# Patient Record
Sex: MALE | Race: WHITE | NOT HISPANIC OR LATINO | Employment: OTHER | URBAN - METROPOLITAN AREA
[De-identification: names, ages, dates, MRNs, and addresses within clinical notes are randomized per-mention and may not be internally consistent; named-entity substitution may affect disease eponyms.]

---

## 2020-04-13 ENCOUNTER — TELEPHONE (OUTPATIENT)
Dept: TRANSPLANT | Facility: CLINIC | Age: 70
End: 2020-04-13

## 2020-04-13 DIAGNOSIS — I50.9 CONGESTIVE HEART FAILURE, UNSPECIFIED HF CHRONICITY, UNSPECIFIED HEART FAILURE TYPE: Primary | ICD-10-CM

## 2020-04-13 NOTE — TELEPHONE ENCOUNTER
"Per referral coordinator, pt was offered a consult appt ASAP.  Pt declined stating he did not intend to risk covid exposure and chose to wait until early June to make his appt.    Pt reports he walks "2 miles every day and I'm fine waiting for now".   Pt instructed to call if he wishes to move to a sooner appt.   "

## 2020-06-05 ENCOUNTER — INITIAL CONSULT (OUTPATIENT)
Dept: TRANSPLANT | Facility: CLINIC | Age: 70
End: 2020-06-05
Attending: INTERNAL MEDICINE
Payer: MEDICARE

## 2020-06-05 ENCOUNTER — CLINICAL SUPPORT (OUTPATIENT)
Dept: TRANSPLANT | Facility: CLINIC | Age: 70
End: 2020-06-05
Payer: MEDICARE

## 2020-06-05 ENCOUNTER — EDUCATION (OUTPATIENT)
Dept: TRANSPLANT | Facility: CLINIC | Age: 70
End: 2020-06-05

## 2020-06-05 ENCOUNTER — HOSPITAL ENCOUNTER (OUTPATIENT)
Dept: PULMONOLOGY | Facility: CLINIC | Age: 70
Discharge: HOME | End: 2020-06-05
Attending: INTERNAL MEDICINE
Payer: MEDICARE

## 2020-06-05 ENCOUNTER — HOSPITAL ENCOUNTER (OUTPATIENT)
Dept: CARDIOLOGY | Facility: CLINIC | Age: 70
Discharge: HOME | End: 2020-06-05
Attending: INTERNAL MEDICINE
Payer: MEDICARE

## 2020-06-05 VITALS — BODY MASS INDEX: 25.11 KG/M2 | WEIGHT: 160 LBS | HEIGHT: 67 IN

## 2020-06-05 VITALS
WEIGHT: 164.88 LBS | SYSTOLIC BLOOD PRESSURE: 113 MMHG | DIASTOLIC BLOOD PRESSURE: 77 MMHG | BODY MASS INDEX: 25.88 KG/M2 | HEIGHT: 67 IN | HEART RATE: 54 BPM

## 2020-06-05 DIAGNOSIS — I50.22 CHRONIC SYSTOLIC CONGESTIVE HEART FAILURE: Primary | ICD-10-CM

## 2020-06-05 DIAGNOSIS — I48.91 ATRIAL FIBRILLATION WITH CONTROLLED VENTRICULAR RESPONSE: ICD-10-CM

## 2020-06-05 DIAGNOSIS — I25.10 CORONARY ARTERY DISEASE INVOLVING NATIVE CORONARY ARTERY OF NATIVE HEART WITHOUT ANGINA PECTORIS: ICD-10-CM

## 2020-06-05 DIAGNOSIS — Z87.11 HISTORY OF PEPTIC ULCER: ICD-10-CM

## 2020-06-05 DIAGNOSIS — I50.9 CONGESTIVE HEART FAILURE, UNSPECIFIED HF CHRONICITY, UNSPECIFIED HEART FAILURE TYPE: ICD-10-CM

## 2020-06-05 DIAGNOSIS — I42.9 CARDIOMYOPATHY, UNSPECIFIED TYPE: ICD-10-CM

## 2020-06-05 DIAGNOSIS — N18.3 ACUTE RENAL FAILURE SUPERIMPOSED ON STAGE 3 CHRONIC KIDNEY DISEASE, UNSPECIFIED ACUTE RENAL FAILURE TYPE: ICD-10-CM

## 2020-06-05 DIAGNOSIS — R74.01 ELEVATED TRANSAMINASE LEVEL: ICD-10-CM

## 2020-06-05 DIAGNOSIS — I50.22 CHRONIC SYSTOLIC CONGESTIVE HEART FAILURE: ICD-10-CM

## 2020-06-05 DIAGNOSIS — Z85.72 HISTORY OF B-CELL LYMPHOMA: ICD-10-CM

## 2020-06-05 DIAGNOSIS — N17.9 ACUTE RENAL FAILURE SUPERIMPOSED ON STAGE 3 CHRONIC KIDNEY DISEASE, UNSPECIFIED ACUTE RENAL FAILURE TYPE: ICD-10-CM

## 2020-06-05 PROCEDURE — 99213 OFFICE O/P EST LOW 20 MIN: CPT | Mod: PBBFAC,25,TXP | Performed by: INTERNAL MEDICINE

## 2020-06-05 PROCEDURE — 99999 PR PBB SHADOW E&M-EST. PATIENT-LVL III: CPT | Mod: PBBFAC,TXP,, | Performed by: INTERNAL MEDICINE

## 2020-06-05 PROCEDURE — 93005 ELECTROCARDIOGRAM TRACING: CPT | Mod: PBBFAC,NTX | Performed by: INTERNAL MEDICINE

## 2020-06-05 PROCEDURE — 99205 PR OFFICE/OUTPT VISIT, NEW, LEVL V, 60-74 MIN: ICD-10-PCS | Mod: S$PBB,NTX,, | Performed by: INTERNAL MEDICINE

## 2020-06-05 PROCEDURE — 94618 PULMONARY STRESS TESTING: CPT | Mod: PBBFAC,NTX | Performed by: INTERNAL MEDICINE

## 2020-06-05 PROCEDURE — 93010 EKG 12-LEAD: ICD-10-PCS | Mod: S$PBB,NTX,, | Performed by: INTERNAL MEDICINE

## 2020-06-05 PROCEDURE — 99205 OFFICE O/P NEW HI 60 MIN: CPT | Mod: S$PBB,NTX,, | Performed by: INTERNAL MEDICINE

## 2020-06-05 PROCEDURE — 94618 PULMONARY STRESS TESTING: CPT | Mod: 26,S$PBB,NTX, | Performed by: INTERNAL MEDICINE

## 2020-06-05 PROCEDURE — 94618 PULMONARY STRESS TESTING: ICD-10-PCS | Mod: 26,S$PBB,NTX, | Performed by: INTERNAL MEDICINE

## 2020-06-05 PROCEDURE — 99999 PR PBB SHADOW E&M-EST. PATIENT-LVL III: ICD-10-PCS | Mod: PBBFAC,TXP,, | Performed by: INTERNAL MEDICINE

## 2020-06-05 PROCEDURE — 93010 ELECTROCARDIOGRAM REPORT: CPT | Mod: S$PBB,NTX,, | Performed by: INTERNAL MEDICINE

## 2020-06-05 RX ORDER — TORSEMIDE 100 MG/1
0.5 TABLET ORAL 2 TIMES DAILY
COMMUNITY
Start: 2020-05-24 | End: 2020-06-05 | Stop reason: SDUPTHER

## 2020-06-05 RX ORDER — POTASSIUM CHLORIDE 20 MEQ/1
20 TABLET, EXTENDED RELEASE ORAL ONCE
Qty: 60 TABLET | Refills: 0 | Status: SHIPPED | OUTPATIENT
Start: 2020-06-05 | End: 2020-06-05

## 2020-06-05 RX ORDER — ATORVASTATIN CALCIUM 40 MG/1
40 TABLET, FILM COATED ORAL DAILY
COMMUNITY

## 2020-06-05 RX ORDER — DIGOXIN 125 MCG
125 TABLET ORAL
Qty: 12 TABLET | Refills: 1 | Status: SHIPPED | OUTPATIENT
Start: 2020-06-05 | End: 2020-08-11 | Stop reason: SDUPTHER

## 2020-06-05 RX ORDER — OMEPRAZOLE 40 MG/1
40 CAPSULE, DELAYED RELEASE ORAL DAILY
COMMUNITY
Start: 2020-04-06 | End: 2020-08-06

## 2020-06-05 RX ORDER — METOPROLOL TARTRATE 25 MG/1
25 TABLET, FILM COATED ORAL DAILY
COMMUNITY
Start: 2020-05-07 | End: 2020-08-21 | Stop reason: ALTCHOICE

## 2020-06-05 RX ORDER — AMIODARONE HYDROCHLORIDE 200 MG/1
200 TABLET ORAL 2 TIMES DAILY
Status: ON HOLD | COMMUNITY
End: 2020-08-07 | Stop reason: SDUPTHER

## 2020-06-05 RX ORDER — MILRINONE LACTATE 1 MG/ML
INJECTION INTRAVENOUS
Status: ON HOLD | COMMUNITY
End: 2020-08-07 | Stop reason: HOSPADM

## 2020-06-05 RX ORDER — POTASSIUM CHLORIDE 20 MEQ/1
20 TABLET, EXTENDED RELEASE ORAL 2 TIMES DAILY
COMMUNITY
End: 2020-06-05 | Stop reason: SDUPTHER

## 2020-06-05 RX ORDER — FAMOTIDINE 40 MG/1
40 TABLET, FILM COATED ORAL NIGHTLY
COMMUNITY
End: 2020-08-21

## 2020-06-05 RX ORDER — LEVOTHYROXINE SODIUM 112 UG/1
100 CAPSULE ORAL EVERY MORNING
COMMUNITY

## 2020-06-05 RX ORDER — NITROGLYCERIN 0.4 MG/1
0.4 TABLET SUBLINGUAL EVERY 5 MIN PRN
COMMUNITY
End: 2020-08-06

## 2020-06-05 RX ORDER — TRAZODONE HYDROCHLORIDE 50 MG/1
50 TABLET ORAL
COMMUNITY
End: 2020-08-06

## 2020-06-05 RX ORDER — TORSEMIDE 100 MG/1
TABLET ORAL
Qty: 60 TABLET | Refills: 0 | Status: ON HOLD
Start: 2020-06-05 | End: 2020-08-07 | Stop reason: SDUPTHER
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
## 2020-06-05 NOTE — LETTER
June 7, 2020        Jaylin Campos  [address] [place] [address]  [place] [address]  Phone: [phone]  Fax: [phone]             [facility] [address] [place] [address]  Phone: [phone]   Patient: Juan Alberto Benavides   MR Number: [id number]   YOB: 1950   Date of Visit: 6/5/2020       Dear Dr. Jaylin Campos    Thank you for referring Juan Alberto Benavides to me for evaluation. Attached you will find relevant portions of my assessment and plan of care.    If you have questions, please do not hesitate to call me. I look forward to following Juan Alberto Benavides along with you.    Sincerely,    Antonio Barr Jr, MD    Enclosure    If you would like to receive this communication electronically, please contact [email or web address] or [phone] to request [employer] access.    [employer] is a tool which provides read-only access to select patient information with whom you have a relationship. Its easy to use and provides real time access to review your patients record including encounter summaries, notes, results, and demographic information.    If you feel you have received this communication in error or would no longer like to receive these types of communications, please e-mail [email or web address]

## 2020-06-05 NOTE — PROGRESS NOTES
PRE-EDUCATION BOOKLET NOTE:    Met with Juan Alberto Benavides and had a brief discussion regarding the advanced heart failure evaluation process including options for heart transplantation and/or left ventricular assist device (LVAD) implantation.     Heart Transplant Educational Booklet given to patient, which included the following handouts:  · Treatment options for Advanced Heart Failure: A Referral Guide for patients  · Pre Heart Transplant Coordinator Team Contact Information   · Recipient Informed Consent   · Wellness Contract  · Multiple Listing Protocol and UNOS toll free numbers   · VAD Education Packet   · Advanced Directives  · 8 Step Plan for Heart Failure Patients     Significant History:  · Prior sternotomies: No  · ICD NO- but has Life Vest  · Blood transfusions NO  · Angiography : Yes: [facility] and [facility]  · Colonoscopy yes- [facility] [phone]  · Tobacco history Smoked x 4 years, QUIT 1978  · Stroke history:  ? TIA?  · Thromboembolism history:  yes- in Right forearm.  Scans at [facility] [phone]    Question and answer session was conducted.  Patient was advised to bring the educational packet to future appointments and/or admissions.  Patient was encouraged to contact the coordinator team with any questions or concerns.  Understanding verbalized.

## 2020-06-05 NOTE — PROCEDURES
Juan Alberto Benavides is a 69 y.o.  male patient, who presents for a 6 minute walk test ordered by MD Momo.  The diagnosis is Pre Heart Transplant; Cardiomyopathy.  The patient's BMI is 25.1 kg/m2.  Predicted distance (lower limit of normal) is 381.21 meters.      Test Results:    The test was completed without stopping.  The total time walked was 360 seconds.  During walking, the patient reported:  No complaints.  The patient used no assistive devices during testing.     06/05/2020---------Distance: 502.92 meters (1650 feet)     O2 Sat % Supplemental Oxygen Heart Rate Blood Pressure Torrey Scale   Pre-exercise  (Resting) 98 % Room Air 76 bpm 104/59 mmHg 0.5   During Exercise 97 % Room Air 125 bpm 135/65 mmHg 1   Post-exercise  (Recovery) 98 % Room Air  88 bpm 118/65 mmHg      Recovery Time: 121 seconds    Performing nurse/tech: Diaz CORTES      PREVIOUS STUDY:   The patient has not had a previous study.      CLINICAL INTERPRETATION:  Six minute walk distance is 502.92 meters (1650 feet) with very light dyspnea.  During exercise, there was no significant desaturation while breathing room air.  Blood pressure remained stable and Heart rate increased significantly with walking.  The patient did not report non-pulmonary symptoms during exercise.  No previous study performed.  Based upon age and body mass index, exercise capacity is normal.

## 2020-06-05 NOTE — PATIENT INSTRUCTIONS
I need catheterization discs and reports from [facility] and [facility]    Reduce torsemide and potassium to once a day    Repeat lab is being done weekly so having all of the lab obtained since started and all future sent here would help me adjust medications

## 2020-06-07 PROBLEM — Z85.72 HISTORY OF B-CELL LYMPHOMA: Status: ACTIVE | Noted: 2020-06-07

## 2020-06-07 PROBLEM — I42.9 CARDIOMYOPATHY: Status: ACTIVE | Noted: 2020-06-07

## 2020-06-07 PROBLEM — I48.91 ATRIAL FIBRILLATION WITH CONTROLLED VENTRICULAR RESPONSE: Status: ACTIVE | Noted: 2020-06-07

## 2020-06-07 PROBLEM — N18.30 ACUTE RENAL FAILURE SUPERIMPOSED ON STAGE 3 CHRONIC KIDNEY DISEASE: Status: ACTIVE | Noted: 2020-06-07

## 2020-06-07 PROBLEM — Z87.11 HISTORY OF PEPTIC ULCER: Status: ACTIVE | Noted: 2020-06-07

## 2020-06-07 PROBLEM — I25.10 CORONARY ARTERY DISEASE INVOLVING NATIVE CORONARY ARTERY OF NATIVE HEART WITHOUT ANGINA PECTORIS: Status: ACTIVE | Noted: 2020-06-07

## 2020-06-07 PROBLEM — R74.01 ELEVATED TRANSAMINASE LEVEL: Status: ACTIVE | Noted: 2020-06-07

## 2020-06-07 PROBLEM — N17.9 ACUTE RENAL FAILURE SUPERIMPOSED ON STAGE 3 CHRONIC KIDNEY DISEASE: Status: ACTIVE | Noted: 2020-06-07

## 2020-06-09 ENCOUNTER — TELEPHONE (OUTPATIENT)
Dept: TRANSPLANT | Facility: CLINIC | Age: 70
End: 2020-06-09

## 2020-06-09 NOTE — TELEPHONE ENCOUNTER
----- Message from Gloria Estevez sent at 6/9/2020 11:33 AM CDT -----  Contact: Malina [phone] with [facility]  Malina f/u on a message she received in ref to the pt heart cath and echo that was sent on 4/7/20. She states the disc was sent via [employer]. Please call her at the number listed.    Thanks

## 2020-06-16 DIAGNOSIS — Z01.818 PREOP EXAMINATION: Primary | ICD-10-CM

## 2020-06-18 ENCOUNTER — INITIAL CONSULT (OUTPATIENT)
Dept: CARDIOLOGY | Facility: CLINIC | Age: 70
End: 2020-06-18
Payer: MEDICARE

## 2020-06-18 ENCOUNTER — DOCUMENTATION ONLY (OUTPATIENT)
Dept: CARDIOLOGY | Facility: CLINIC | Age: 70
End: 2020-06-18

## 2020-06-18 VITALS
OXYGEN SATURATION: 98 % | WEIGHT: 167.31 LBS | HEART RATE: 90 BPM | SYSTOLIC BLOOD PRESSURE: 105 MMHG | DIASTOLIC BLOOD PRESSURE: 78 MMHG | HEIGHT: 67 IN | BODY MASS INDEX: 26.26 KG/M2

## 2020-06-18 DIAGNOSIS — N18.30 CKD (CHRONIC KIDNEY DISEASE) STAGE 3, GFR 30-59 ML/MIN: ICD-10-CM

## 2020-06-18 DIAGNOSIS — I25.5 CARDIOMYOPATHY, ISCHEMIC: Primary | ICD-10-CM

## 2020-06-18 DIAGNOSIS — I25.10 CORONARY ARTERY DISEASE INVOLVING NATIVE CORONARY ARTERY OF NATIVE HEART WITHOUT ANGINA PECTORIS: ICD-10-CM

## 2020-06-18 DIAGNOSIS — N18.4 CKD (CHRONIC KIDNEY DISEASE) STAGE 4, GFR 15-29 ML/MIN: ICD-10-CM

## 2020-06-18 DIAGNOSIS — I48.0 PAROXYSMAL ATRIAL FIBRILLATION: ICD-10-CM

## 2020-06-18 DIAGNOSIS — I42.9 CARDIOMYOPATHY, UNSPECIFIED TYPE: ICD-10-CM

## 2020-06-18 DIAGNOSIS — Z01.812 PRE-PROCEDURE LAB EXAM: Primary | ICD-10-CM

## 2020-06-18 DIAGNOSIS — I50.22 CHRONIC SYSTOLIC CONGESTIVE HEART FAILURE: ICD-10-CM

## 2020-06-18 PROCEDURE — 99213 OFFICE O/P EST LOW 20 MIN: CPT | Mod: PBBFAC,NTX | Performed by: INTERNAL MEDICINE

## 2020-06-18 PROCEDURE — 99999 PR PBB SHADOW E&M-EST. PATIENT-LVL III: CPT | Mod: PBBFAC,TXP,, | Performed by: INTERNAL MEDICINE

## 2020-06-18 PROCEDURE — 99204 PR OFFICE/OUTPT VISIT, NEW, LEVL IV, 45-59 MIN: ICD-10-PCS | Mod: S$PBB,NTX,, | Performed by: INTERNAL MEDICINE

## 2020-06-18 PROCEDURE — 99204 OFFICE O/P NEW MOD 45 MIN: CPT | Mod: S$PBB,NTX,, | Performed by: INTERNAL MEDICINE

## 2020-06-18 PROCEDURE — 99999 PR PBB SHADOW E&M-EST. PATIENT-LVL III: ICD-10-PCS | Mod: PBBFAC,TXP,, | Performed by: INTERNAL MEDICINE

## 2020-06-18 RX ORDER — ASPIRIN 81 MG/1
81 TABLET ORAL DAILY
Refills: 0 | Status: ON HOLD
Start: 2020-06-18 | End: 2020-08-07 | Stop reason: HOSPADM

## 2020-06-18 RX ORDER — SODIUM CHLORIDE 9 MG/ML
INJECTION, SOLUTION INTRAVENOUS CONTINUOUS
Status: CANCELLED | OUTPATIENT
Start: 2020-06-18

## 2020-06-18 RX ORDER — CLOPIDOGREL BISULFATE 75 MG/1
TABLET ORAL
Qty: 30 TABLET | Refills: 11 | Status: SHIPPED | OUTPATIENT
Start: 2020-06-18 | End: 2021-03-09 | Stop reason: SDUPTHER

## 2020-06-18 NOTE — LETTER
June 22, 2020      Antonio Barr Jr., MD  [address]  [facility] [address]           [facility]  [address]  [facility] [address]  Phone: [phone]          Patient: Juan Alberto Benavides   MR Number: [id number]   YOB: 1950   Date of Visit: 6/18/2020       Dear Dr. Antonio Barr Jr.:    Thank you for referring Juan Alberto Benavides to me for evaluation. Attached you will find relevant portions of my assessment and plan of care.    If you have questions, please do not hesitate to call me. I look forward to following Juan Alberto Benavides along with you.    Sincerely,    Isiah Rosado MD    Enclosure  CC:  No Recipients    If you would like to receive this communication electronically, please contact [email or web address] or [phone] to request more information on [employer] Link access.    For providers and/or their staff who would like to refer a patient to [facility], please contact us through our one-stop-shop provider referral line, [facility], at [phone].    If you feel you have received this communication in error or would no longer like to receive these types of communications, please e-mail [email or web address]

## 2020-06-18 NOTE — PROGRESS NOTES
OUTPATIENT CATHETERIZATION INSTRUCTIONS    You have been scheduled for a procedure in the catheterization lab on Thursday, June 25, 2020.     Please report to the Cardiology Waiting Area on the Third floor of the hospital and check in at 9 AM.   You will then be taken to the SSCU (Short Stay Cardiac Unit) and prepared for your procedure. Please be aware that this is not the time of your procedure but the time you are to arrive. The procedures are scheduled on an hourly basis; however, emergency cases take precedence over all other cases.       You may not have anything to eat or drink after midnight the night before your test. You may take your regular morning medications with water. If there are any medications that you should not take you will be instructed to hold them that morning. If you are diabetic and on Metformin (Glucophage) do not take it the day before, the day of, and for 2 days after your procedure.      The procedure will take 1-2 hours to perform. After the procedure, you will return to SSCU on the third floor of the hospital. You will need to lie still (or keep your arm still) for the next 4 to 6 hours to minimize bleeding from the puncture site. Your family may remain in the room with you during this time.       You may be able to be discharged home that same afternoon if there is someone to drive you home and there were no complications. If you have one of the balloon, stent, or device procedures you may spend the night in the hospital. Your doctor will determine, based on your progress, the date and time of your discharge. The results of your procedure will be discussed with you before you are discharged. Any further testing or procedures will be scheduled for you either before you leave or you will be called with these appointments.       If you should have any questions, concerns, or need to change the date of your procedure, please call  MAKAYLA Malone @ [phone]    Special  Instructions:  Plavix 75 mg take 8 tablets first day, then one tablet daily including day of procedure  Hold Eliquis 3 days before procedure(Monday, Tuesday and Wednesday)  Drink plenty of water the day before and after your procedure.     THE ABOVE INSTRUCTIONS WERE GIVEN TO THE PATIENT VERBALLY AND THEY VERBALIZED UNDERSTANDING.  THEY DO NOT REQUIRE ANY SPECIAL NEEDS AND DO NOT HAVE ANY LEARNING BARRIERS.          Directions for Reporting to Cardiology Waiting Area in the Hospital  If you park in the Parking Garage:  Take elevators to the1st floor of the parking garage.  Continue past the gift shop, coffee shop, and piano.  Take a right and go to the gold elevators. (Elevator B)  Take the elevator to the 3rd floor.  Follow the arrow on the sign on the wall that says Cath Lab Registration/EP Lab Registration.  Follow the long hallway all the way around until you come to a big open area.  This is the registration area.  Check in at Reception Desk.    OR    If family is dropping you off:  Have them drop you off at the front of the Hospital under the green overhang.  Enter through the doors and take a right.  Take the E elevators to the 3rd floor Cardiology Waiting Area.  Check in at the Reception Desk in the waiting room.

## 2020-06-22 PROBLEM — N18.4 CKD (CHRONIC KIDNEY DISEASE) STAGE 4, GFR 15-29 ML/MIN: Status: ACTIVE | Noted: 2020-06-22

## 2020-06-22 PROBLEM — N18.30 CKD (CHRONIC KIDNEY DISEASE) STAGE 3, GFR 30-59 ML/MIN: Status: RESOLVED | Noted: 2020-06-22 | Resolved: 2020-06-22

## 2020-06-22 PROBLEM — N18.30 CKD (CHRONIC KIDNEY DISEASE) STAGE 3, GFR 30-59 ML/MIN: Status: ACTIVE | Noted: 2020-06-22

## 2020-06-22 PROBLEM — I48.0 PAROXYSMAL ATRIAL FIBRILLATION: Status: ACTIVE | Noted: 2020-06-07
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
## 2020-06-24 ENCOUNTER — TELEPHONE (OUTPATIENT)
Dept: TRANSPLANT | Facility: CLINIC | Age: 70
End: 2020-06-24

## 2020-06-24 DIAGNOSIS — I50.22 CHRONIC SYSTOLIC CONGESTIVE HEART FAILURE: Primary | ICD-10-CM

## 2020-06-24 NOTE — TELEPHONE ENCOUNTER
"I called pt- he says that his PICC line "looks infected" and may have moved.  Reports that it flushes without difficulty, but the nurse is unable to draw labs from the site.  Picc placed in [place] in April, Unknown MD [facility] is infusion company    I let Mrs Alves know that I think he needs his PICC looked at, and will contact [facility] to see if they can help trouble shoot this  Additionally, will need to determine prescribing MD for aguilar  Will notify MD electronically, and contact Dr Pearce's office as he has a procedure tomorrow    I spoke with IR at [facility], who may be able to consult on this pt, as   1) PICC placed elsewhere  2) Unable to draw lab from PICC   3) Appears "infected" per statement by Mrs Benavides.      "

## 2020-06-24 NOTE — TELEPHONE ENCOUNTER
----- Message from Angie Cleary sent at 6/24/2020 10:26 AM CDT -----  Regarding: PICC line question  Contact: patient  The pt is calling about a question for his PICC line. Please call him back @ [phone] or [phone]. Thanks, Angie

## 2020-06-25 ENCOUNTER — HOSPITAL ENCOUNTER (INPATIENT)
Facility: HOSPITAL | Age: 70
LOS: 1 days | Discharge: HOME | DRG: 215 | End: 2020-06-26
Attending: INTERNAL MEDICINE | Admitting: INTERNAL MEDICINE
Payer: MEDICARE

## 2020-06-25 ENCOUNTER — TELEPHONE (OUTPATIENT)
Dept: TRANSPLANT | Facility: CLINIC | Age: 70
End: 2020-06-25

## 2020-06-25 DIAGNOSIS — I25.5 CARDIOMYOPATHY, ISCHEMIC: ICD-10-CM

## 2020-06-25 DIAGNOSIS — I25.10 CAD (CORONARY ARTERY DISEASE): ICD-10-CM

## 2020-06-25 LAB
ABO + RH BLD: NORMAL
ANION GAP SERPL CALC-SCNC: 11 MMOL/L (ref 8–16)
BASOPHILS # BLD AUTO: 0.05 K/UL (ref 0–0.2)
BASOPHILS # BLD AUTO: 0.06 K/UL (ref 0–0.2)
BASOPHILS # BLD AUTO: 0.07 K/UL (ref 0–0.2)
BASOPHILS NFR BLD: 0.6 % (ref 0–1.9)
BASOPHILS NFR BLD: 0.6 % (ref 0–1.9)
BASOPHILS NFR BLD: 0.9 % (ref 0–1.9)
BLD GP AB SCN CELLS X3 SERPL QL: NORMAL
BUN SERPL-MCNC: 32 MG/DL (ref 8–23)
CALCIUM SERPL-MCNC: 9.2 MG/DL (ref 8.7–10.5)
CHLORIDE SERPL-SCNC: 103 MMOL/L (ref 95–110)
CO2 SERPL-SCNC: 30 MMOL/L (ref 23–29)
CREAT SERPL-MCNC: 1.6 MG/DL (ref 0.5–1.4)
DIFFERENTIAL METHOD: ABNORMAL
DIGOXIN SERPL-MCNC: 0.5 NG/ML (ref 0.8–2)
EOSINOPHIL # BLD AUTO: 0 K/UL (ref 0–0.5)
EOSINOPHIL # BLD AUTO: 0.1 K/UL (ref 0–0.5)
EOSINOPHIL # BLD AUTO: 0.1 K/UL (ref 0–0.5)
EOSINOPHIL NFR BLD: 0.4 % (ref 0–8)
EOSINOPHIL NFR BLD: 1.3 % (ref 0–8)
EOSINOPHIL NFR BLD: 1.5 % (ref 0–8)
ERYTHROCYTE [DISTWIDTH] IN BLOOD BY AUTOMATED COUNT: 17.9 % (ref 11.5–14.5)
ERYTHROCYTE [DISTWIDTH] IN BLOOD BY AUTOMATED COUNT: 18.1 % (ref 11.5–14.5)
ERYTHROCYTE [DISTWIDTH] IN BLOOD BY AUTOMATED COUNT: 18.4 % (ref 11.5–14.5)
EST. GFR  (AFRICAN AMERICAN): 50.8 ML/MIN/1.73 M^2
EST. GFR  (NON AFRICAN AMERICAN): 43.9 ML/MIN/1.73 M^2
GLUCOSE SERPL-MCNC: 109 MG/DL (ref 70–110)
HCT VFR BLD AUTO: 32.5 % (ref 40–54)
HCT VFR BLD AUTO: 37.4 % (ref 40–54)
HCT VFR BLD AUTO: 42.2 % (ref 40–54)
HGB BLD-MCNC: 10.5 G/DL (ref 14–18)
HGB BLD-MCNC: 12 G/DL (ref 14–18)
HGB BLD-MCNC: 13.4 G/DL (ref 14–18)
IMM GRANULOCYTES # BLD AUTO: 0.02 K/UL (ref 0–0.04)
IMM GRANULOCYTES # BLD AUTO: 0.02 K/UL (ref 0–0.04)
IMM GRANULOCYTES # BLD AUTO: 0.04 K/UL (ref 0–0.04)
IMM GRANULOCYTES NFR BLD AUTO: 0.3 % (ref 0–0.5)
IMM GRANULOCYTES NFR BLD AUTO: 0.3 % (ref 0–0.5)
IMM GRANULOCYTES NFR BLD AUTO: 0.4 % (ref 0–0.5)
LYMPHOCYTES # BLD AUTO: 1.6 K/UL (ref 1–4.8)
LYMPHOCYTES # BLD AUTO: 2.3 K/UL (ref 1–4.8)
LYMPHOCYTES # BLD AUTO: 3.3 K/UL (ref 1–4.8)
LYMPHOCYTES NFR BLD: 20.2 % (ref 18–48)
LYMPHOCYTES NFR BLD: 29 % (ref 18–48)
LYMPHOCYTES NFR BLD: 33.7 % (ref 18–48)
MCH RBC QN AUTO: 27.2 PG (ref 27–31)
MCH RBC QN AUTO: 27.6 PG (ref 27–31)
MCH RBC QN AUTO: 27.8 PG (ref 27–31)
MCHC RBC AUTO-ENTMCNC: 31.8 G/DL (ref 32–36)
MCHC RBC AUTO-ENTMCNC: 32.1 G/DL (ref 32–36)
MCHC RBC AUTO-ENTMCNC: 32.3 G/DL (ref 32–36)
MCV RBC AUTO: 86 FL (ref 82–98)
MCV RBC AUTO: 86 FL (ref 82–98)
MCV RBC AUTO: 87 FL (ref 82–98)
MONOCYTES # BLD AUTO: 0.8 K/UL (ref 0.3–1)
MONOCYTES # BLD AUTO: 1 K/UL (ref 0.3–1)
MONOCYTES # BLD AUTO: 1 K/UL (ref 0.3–1)
MONOCYTES NFR BLD: 10.2 % (ref 4–15)
MONOCYTES NFR BLD: 10.3 % (ref 4–15)
MONOCYTES NFR BLD: 12.5 % (ref 4–15)
NEUTROPHILS # BLD AUTO: 4.4 K/UL (ref 1.8–7.7)
NEUTROPHILS # BLD AUTO: 5.2 K/UL (ref 1.8–7.7)
NEUTROPHILS # BLD AUTO: 5.5 K/UL (ref 1.8–7.7)
NEUTROPHILS NFR BLD: 53.7 % (ref 38–73)
NEUTROPHILS NFR BLD: 55.8 % (ref 38–73)
NEUTROPHILS NFR BLD: 68.3 % (ref 38–73)
NRBC BLD-RTO: 0 /100 WBC
PLATELET # BLD AUTO: 307 K/UL (ref 150–350)
PLATELET # BLD AUTO: 327 K/UL (ref 150–350)
PLATELET # BLD AUTO: 383 K/UL (ref 150–350)
PMV BLD AUTO: 10.2 FL (ref 9.2–12.9)
PMV BLD AUTO: 10.3 FL (ref 9.2–12.9)
PMV BLD AUTO: 9.8 FL (ref 9.2–12.9)
POTASSIUM SERPL-SCNC: 3.8 MMOL/L (ref 3.5–5.1)
RBC # BLD AUTO: 3.8 M/UL (ref 4.6–6.2)
RBC # BLD AUTO: 4.32 M/UL (ref 4.6–6.2)
RBC # BLD AUTO: 4.92 M/UL (ref 4.6–6.2)
SODIUM SERPL-SCNC: 144 MMOL/L (ref 136–145)
WBC # BLD AUTO: 7.92 K/UL (ref 3.9–12.7)
WBC # BLD AUTO: 7.97 K/UL (ref 3.9–12.7)
WBC # BLD AUTO: 9.68 K/UL (ref 3.9–12.7)

## 2020-06-25 PROCEDURE — C1725 CATH, TRANSLUMIN NON-LASER: HCPCS | Mod: NTX | Performed by: INTERNAL MEDICINE

## 2020-06-25 PROCEDURE — 92978 ENDOLUMINL IVUS OCT C 1ST: CPT | Mod: NTX | Performed by: INTERNAL MEDICINE

## 2020-06-25 PROCEDURE — C9602 PERC D-E COR STENT ATHER S: HCPCS | Mod: LM,NTX | Performed by: INTERNAL MEDICINE

## 2020-06-25 PROCEDURE — 25000003 PHARM REV CODE 250: Mod: TXP | Performed by: INTERNAL MEDICINE

## 2020-06-25 PROCEDURE — C1874 STENT, COATED/COV W/DEL SYS: HCPCS | Mod: NTX | Performed by: INTERNAL MEDICINE

## 2020-06-25 PROCEDURE — C1753 CATH, INTRAVAS ULTRASOUND: HCPCS | Mod: NTX | Performed by: INTERNAL MEDICINE

## 2020-06-25 PROCEDURE — 63600175 PHARM REV CODE 636 W HCPCS: Mod: TXP | Performed by: INTERNAL MEDICINE

## 2020-06-25 PROCEDURE — 99152 MOD SED SAME PHYS/QHP 5/>YRS: CPT | Mod: GC,NTX,, | Performed by: INTERNAL MEDICINE

## 2020-06-25 PROCEDURE — C1760 CLOSURE DEV, VASC: HCPCS | Mod: NTX | Performed by: INTERNAL MEDICINE

## 2020-06-25 PROCEDURE — 27000426 HC IMPELLA SET UP: Mod: TXP

## 2020-06-25 PROCEDURE — 25000003 PHARM REV CODE 250: Mod: NTX | Performed by: INTERNAL MEDICINE

## 2020-06-25 PROCEDURE — 27201423 OPTIME MED/SURG SUP & DEVICES STERILE SUPPLY: Mod: NTX | Performed by: INTERNAL MEDICINE

## 2020-06-25 PROCEDURE — 36620 ARTERIAL LINE: ICD-10-PCS | Mod: NTX,,, | Performed by: INTERNAL MEDICINE

## 2020-06-25 PROCEDURE — 80162 ASSAY OF DIGOXIN TOTAL: CPT | Mod: NTX

## 2020-06-25 PROCEDURE — C1769 GUIDE WIRE: HCPCS | Mod: NTX | Performed by: INTERNAL MEDICINE

## 2020-06-25 PROCEDURE — 99153 MOD SED SAME PHYS/QHP EA: CPT | Mod: NTX | Performed by: INTERNAL MEDICINE

## 2020-06-25 PROCEDURE — C1724 CATH, TRANS ATHEREC,ROTATION: HCPCS | Mod: NTX | Performed by: INTERNAL MEDICINE

## 2020-06-25 PROCEDURE — 92933 PR STENT & ATHERECTOMY: ICD-10-PCS | Mod: LM,GC,NTX, | Performed by: INTERNAL MEDICINE

## 2020-06-25 PROCEDURE — 93456 R HRT CORONARY ARTERY ANGIO: CPT | Mod: 59,GC,NTX | Performed by: INTERNAL MEDICINE

## 2020-06-25 PROCEDURE — 94761 N-INVAS EAR/PLS OXIMETRY MLT: CPT | Mod: NTX

## 2020-06-25 PROCEDURE — 80048 BASIC METABOLIC PNL TOTAL CA: CPT | Mod: NTX

## 2020-06-25 PROCEDURE — 92978 ENDOLUMINL IVUS OCT C 1ST: CPT | Mod: 26,GC,NTX, | Performed by: INTERNAL MEDICINE

## 2020-06-25 PROCEDURE — 93456 R HRT CORONARY ARTERY ANGIO: CPT | Mod: 26,51,59,GC | Performed by: INTERNAL MEDICINE

## 2020-06-25 PROCEDURE — C1887 CATHETER, GUIDING: HCPCS | Mod: NTX | Performed by: INTERNAL MEDICINE

## 2020-06-25 PROCEDURE — 27800903 OPTIME MED/SURG SUP & DEVICES OTHER IMPLANTS: Mod: NTX | Performed by: INTERNAL MEDICINE

## 2020-06-25 PROCEDURE — 36415 COLL VENOUS BLD VENIPUNCTURE: CPT | Mod: NTX

## 2020-06-25 PROCEDURE — 20000000 HC ICU ROOM: Mod: NTX

## 2020-06-25 PROCEDURE — C1894 INTRO/SHEATH, NON-LASER: HCPCS | Mod: NTX | Performed by: INTERNAL MEDICINE

## 2020-06-25 PROCEDURE — 33999 UNLISTED PX CARDIAC SURGERY: CPT | Mod: NTX | Performed by: INTERNAL MEDICINE

## 2020-06-25 PROCEDURE — 99152 MOD SED SAME PHYS/QHP 5/>YRS: CPT | Mod: NTX | Performed by: INTERNAL MEDICINE

## 2020-06-25 PROCEDURE — 33999 UNLISTED PX CARDIAC SURGERY: CPT | Mod: GC,NTX,, | Performed by: INTERNAL MEDICINE

## 2020-06-25 PROCEDURE — 85025 COMPLETE CBC W/AUTO DIFF WBC: CPT | Mod: 91,NTX

## 2020-06-25 PROCEDURE — 92978 PR IVUS, CORONARY, 1ST VESSEL: ICD-10-PCS | Mod: 26,GC,NTX, | Performed by: INTERNAL MEDICINE

## 2020-06-25 PROCEDURE — C1751 CATH, INF, PER/CENT/MIDLINE: HCPCS | Mod: NTX | Performed by: INTERNAL MEDICINE

## 2020-06-25 PROCEDURE — 86850 RBC ANTIBODY SCREEN: CPT | Mod: NTX

## 2020-06-25 PROCEDURE — 85347 COAGULATION TIME ACTIVATED: CPT | Mod: NTX | Performed by: INTERNAL MEDICINE

## 2020-06-25 PROCEDURE — 93456 PR CATH PLACE/CORONARY ANGIO, IMG SUPER/INTERP,W RIGHT HEART CATH: ICD-10-PCS | Mod: 26,51,59,GC | Performed by: INTERNAL MEDICINE

## 2020-06-25 PROCEDURE — 25500020 PHARM REV CODE 255: Mod: TXP | Performed by: INTERNAL MEDICINE

## 2020-06-25 PROCEDURE — 92933 PRQ TRLML C ATHRC ST ANGIOP1: CPT | Mod: LM,GC,NTX, | Performed by: INTERNAL MEDICINE

## 2020-06-25 PROCEDURE — 36620 INSERTION CATHETER ARTERY: CPT | Mod: NTX,,, | Performed by: INTERNAL MEDICINE

## 2020-06-25 PROCEDURE — 33999: ICD-10-PCS | Mod: GC,NTX,, | Performed by: INTERNAL MEDICINE

## 2020-06-25 PROCEDURE — 99152 PR MOD CONSCIOUS SEDATION, SAME PHYS, 5+ YRS, FIRST 15 MIN: ICD-10-PCS | Mod: GC,NTX,, | Performed by: INTERNAL MEDICINE

## 2020-06-25 DEVICE — STENT RONYX45012UX RESOLUTE ONYX 4.50X12
Type: IMPLANTABLE DEVICE | Site: CORONARY | Status: FUNCTIONAL
Brand: RESOLUTE ONYX™

## 2020-06-25 RX ORDER — MIDAZOLAM HYDROCHLORIDE 1 MG/ML
INJECTION, SOLUTION INTRAMUSCULAR; INTRAVENOUS
Status: DISCONTINUED | OUTPATIENT
Start: 2020-06-25 | End: 2020-06-25 | Stop reason: HOSPADM

## 2020-06-25 RX ORDER — CLOPIDOGREL BISULFATE 75 MG/1
75 TABLET ORAL DAILY
Status: DISCONTINUED | OUTPATIENT
Start: 2020-06-25 | End: 2020-06-26

## 2020-06-25 RX ORDER — AMIODARONE HYDROCHLORIDE 200 MG/1
200 TABLET ORAL 2 TIMES DAILY
Status: DISCONTINUED | OUTPATIENT
Start: 2020-06-25 | End: 2020-06-26 | Stop reason: HOSPADM

## 2020-06-25 RX ORDER — CLOPIDOGREL 300 MG/1
600 TABLET, FILM COATED ORAL ONCE
Status: COMPLETED | OUTPATIENT
Start: 2020-06-25 | End: 2020-06-25

## 2020-06-25 RX ORDER — NAPROXEN SODIUM 220 MG/1
325 TABLET, FILM COATED ORAL ONCE
Status: DISCONTINUED | OUTPATIENT
Start: 2020-06-25 | End: 2020-06-26

## 2020-06-25 RX ORDER — VERAPAMIL HYDROCHLORIDE 2.5 MG/ML
INJECTION, SOLUTION INTRAVENOUS
Status: DISCONTINUED | OUTPATIENT
Start: 2020-06-25 | End: 2020-06-25 | Stop reason: HOSPADM

## 2020-06-25 RX ORDER — CEFAZOLIN SODIUM 1 G/3ML
INJECTION, POWDER, FOR SOLUTION INTRAMUSCULAR; INTRAVENOUS
Status: DISCONTINUED | OUTPATIENT
Start: 2020-06-25 | End: 2020-06-25 | Stop reason: HOSPADM

## 2020-06-25 RX ORDER — LEVOTHYROXINE SODIUM 100 UG/1
100 TABLET ORAL EVERY MORNING
Status: DISCONTINUED | OUTPATIENT
Start: 2020-06-26 | End: 2020-06-26 | Stop reason: HOSPADM

## 2020-06-25 RX ORDER — DIGOXIN 125 MCG
125 TABLET ORAL
Status: DISCONTINUED | OUTPATIENT
Start: 2020-06-26 | End: 2020-06-26 | Stop reason: HOSPADM

## 2020-06-25 RX ORDER — NITROGLYCERIN 5 MG/ML
INJECTION, SOLUTION INTRAVENOUS
Status: DISCONTINUED | OUTPATIENT
Start: 2020-06-25 | End: 2020-06-25 | Stop reason: HOSPADM

## 2020-06-25 RX ORDER — DIPHENHYDRAMINE HYDROCHLORIDE 50 MG/ML
25 INJECTION INTRAMUSCULAR; INTRAVENOUS ONCE
Status: COMPLETED | OUTPATIENT
Start: 2020-06-25 | End: 2020-06-25

## 2020-06-25 RX ORDER — HEPARIN SODIUM 1000 [USP'U]/ML
INJECTION, SOLUTION INTRAVENOUS; SUBCUTANEOUS
Status: DISCONTINUED | OUTPATIENT
Start: 2020-06-25 | End: 2020-06-25 | Stop reason: HOSPADM

## 2020-06-25 RX ORDER — SODIUM CHLORIDE 9 MG/ML
INJECTION, SOLUTION INTRAVENOUS CONTINUOUS
Status: ACTIVE | OUTPATIENT
Start: 2020-06-25 | End: 2020-06-25

## 2020-06-25 RX ORDER — ASPIRIN 81 MG/1
81 TABLET ORAL DAILY
Status: DISCONTINUED | OUTPATIENT
Start: 2020-06-26 | End: 2020-06-26 | Stop reason: HOSPADM

## 2020-06-25 RX ORDER — ATORVASTATIN CALCIUM 20 MG/1
40 TABLET, FILM COATED ORAL DAILY
Status: DISCONTINUED | OUTPATIENT
Start: 2020-06-25 | End: 2020-06-26 | Stop reason: HOSPADM

## 2020-06-25 RX ORDER — METOPROLOL TARTRATE 25 MG/1
25 TABLET, FILM COATED ORAL DAILY
Status: DISCONTINUED | OUTPATIENT
Start: 2020-06-25 | End: 2020-06-25

## 2020-06-25 RX ORDER — HEPARIN SODIUM 200 [USP'U]/100ML
INJECTION, SOLUTION INTRAVENOUS
Status: DISCONTINUED | OUTPATIENT
Start: 2020-06-25 | End: 2020-06-26 | Stop reason: HOSPADM

## 2020-06-25 RX ORDER — DIPHENHYDRAMINE HCL 50 MG
50 CAPSULE ORAL ONCE
Status: COMPLETED | OUTPATIENT
Start: 2020-06-25 | End: 2020-06-25

## 2020-06-25 RX ORDER — ASPIRIN 325 MG
325 TABLET ORAL ONCE
Status: COMPLETED | OUTPATIENT
Start: 2020-06-25 | End: 2020-06-25

## 2020-06-25 RX ORDER — SODIUM CHLORIDE 9 MG/ML
INJECTION, SOLUTION INTRAVENOUS
Status: DISCONTINUED | OUTPATIENT
Start: 2020-06-25 | End: 2020-06-26 | Stop reason: HOSPADM

## 2020-06-25 RX ORDER — TORSEMIDE 100 MG/1
100 TABLET ORAL DAILY
Status: DISCONTINUED | OUTPATIENT
Start: 2020-06-26 | End: 2020-06-25

## 2020-06-25 RX ORDER — CLOPIDOGREL BISULFATE 75 MG/1
75 TABLET ORAL DAILY
Status: DISCONTINUED | OUTPATIENT
Start: 2020-06-26 | End: 2020-06-26 | Stop reason: HOSPADM

## 2020-06-25 RX ORDER — LIDOCAINE HYDROCHLORIDE 20 MG/ML
INJECTION, SOLUTION INFILTRATION; PERINEURAL
Status: DISCONTINUED | OUTPATIENT
Start: 2020-06-25 | End: 2020-06-25 | Stop reason: HOSPADM

## 2020-06-25 RX ORDER — SODIUM CHLORIDE 9 MG/ML
INJECTION, SOLUTION INTRAVENOUS CONTINUOUS
Status: DISCONTINUED | OUTPATIENT
Start: 2020-06-25 | End: 2020-06-26 | Stop reason: HOSPADM

## 2020-06-25 RX ORDER — FENTANYL CITRATE 50 UG/ML
INJECTION, SOLUTION INTRAMUSCULAR; INTRAVENOUS
Status: DISCONTINUED | OUTPATIENT
Start: 2020-06-25 | End: 2020-06-25 | Stop reason: HOSPADM

## 2020-06-25 RX ORDER — NOREPINEPHRINE BITARTRATE/D5W 4MG/250ML
PLASTIC BAG, INJECTION (ML) INTRAVENOUS
Status: DISPENSED
Start: 2020-06-25 | End: 2020-06-26

## 2020-06-25 RX ORDER — PHENYLEPHRINE HCL IN 0.9% NACL 1 MG/10 ML
SYRINGE (ML) INTRAVENOUS
Status: DISPENSED
Start: 2020-06-25 | End: 2020-06-26

## 2020-06-25 RX ADMIN — SODIUM CHLORIDE 250 ML: 0.9 INJECTION, SOLUTION INTRAVENOUS at 07:06

## 2020-06-25 RX ADMIN — ASPIRIN 325 MG ORAL TABLET 325 MG: 325 PILL ORAL at 04:06

## 2020-06-25 RX ADMIN — DIPHENHYDRAMINE HYDROCHLORIDE 25 MG: 50 INJECTION, SOLUTION INTRAMUSCULAR; INTRAVENOUS at 04:06

## 2020-06-25 RX ADMIN — CLOPIDOGREL BISULFATE 600 MG: 300 TABLET, FILM COATED ORAL at 04:06

## 2020-06-25 RX ADMIN — SODIUM CHLORIDE 250 ML: 0.9 INJECTION, SOLUTION INTRAVENOUS at 06:06

## 2020-06-25 RX ADMIN — AMIODARONE HYDROCHLORIDE 200 MG: 200 TABLET ORAL at 02:06

## 2020-06-25 RX ADMIN — SODIUM CHLORIDE: 0.9 INJECTION, SOLUTION INTRAVENOUS at 09:06

## 2020-06-25 RX ADMIN — ATORVASTATIN CALCIUM 40 MG: 20 TABLET, FILM COATED ORAL at 02:06

## 2020-06-25 RX ADMIN — CLOPIDOGREL BISULFATE 75 MG: 75 TABLET ORAL at 10:06

## 2020-06-25 RX ADMIN — AMIODARONE HYDROCHLORIDE 200 MG: 200 TABLET ORAL at 09:06

## 2020-06-25 RX ADMIN — SODIUM CHLORIDE: 0.9 INJECTION, SOLUTION INTRAVENOUS at 02:06

## 2020-06-25 RX ADMIN — DIPHENHYDRAMINE HYDROCHLORIDE 50 MG: 50 CAPSULE ORAL at 09:06

## 2020-06-25 NOTE — TELEPHONE ENCOUNTER
Pre-txp radha Ramesh informed SW pt is on home Milrinone which was initiated at [facility], and pt is now txferring care to [facility]/HTS team & orders for home infusion & HH will be followed by Dr. Barr/HTS team moving forward. SW spoke with pt's wife Sharyn by phone ([phone]), as pt is currently in procedure room with Dr. OTIS Rosado. Wife confirms that pt does not plan to return to cardiology team at [facility] and that pt would like to be followed at [facility] now. Wife reports pt's infusion provider is [facility] in [place] & HH company is [employer] Wife agreeable to SW making calls to both agencies to change following provider to Dr. Barr.    JAMAAL spoke with Jessica Barrow (pharmacist) at [facility] in [place] (ph: [phone], f: [phone]) and spoke with [facility] office (ph: [phone], f: [phone]). Both agencies report they do NOT need new orders to change pt's following provider. Both agencies entered Dr. Barr as new following provider and entered contact info for HTS team. Danial with [facility] reports their dosing weight for pt is 77kg and pt receives 26mg of Milrinone per day.    JAMAAL updated BISHOP Ramesh that following provider has been updated with both agencies. SW remains available.

## 2020-06-25 NOTE — Clinical Note
dry, intact, no bleeding and no hematoma. Rt groin mynx x2, quick clot & tegaderm noted. Lt groin perlcose, quick clot & tegaderm noted.

## 2020-06-25 NOTE — NURSING
"Pt became hypotensive; groins sites assessed. New hematoma present at L groin site. Pt reported "not feeling well" during this time. Manual pressure applied above site; MD Jose called and notified of suspected bleed. 250 cc NS bolus administered and CBC collected. MD Junior at bedside performing echo. Will continue to monitor.  "

## 2020-06-25 NOTE — Clinical Note
Catheter is removed from the ostium   left main. Angiography performed post intervention of the left coronary arteries in multiple views.

## 2020-06-25 NOTE — HPI
Mr. Benavides is a 70 y/o gentleman with PMH chemotherapy induced cardiomyopathy, with subsequent improvement of LVEF and then recurrent HFrEF with EF dropping to 10%, later found to have CAD with left main disease. He now has severe HFrEF and is on a home milrinone infusion. His initial heart failure symptoms included LOPEZ, orthopnea and PND but since beginning his milrinone therapy and adhering to medications and lifestyle recommendations the patient feels symptomatically improved. He denies any history of chest pain/pressure/tightness/discomfort, palpitations, syncope or claudication symptoms. He previously drank a 12 pack of 16 oz beers daily, which he has stopped completely 3 months ago. He stopped eliquis 3 days ago, has started plavix with a 600 mg load and is taking 75 mg daily. He only began taking 81 mg ASA daily starting yesterday.

## 2020-06-25 NOTE — Clinical Note
The PA catheter is repositioned to the right atrium. Hemodynamics performed.  angiography preformed

## 2020-06-25 NOTE — ASSESSMENT & PLAN NOTE
1. Cardiac catheterization with unprotected left main PCI, likely with impella support. Right heart catheterization  2. Antiplatelets: ASA, plavix: patient did not take plavix this morning, 75 mg now. Patient was not loaded with ASA, started taking 81 mg yesterday, will give 325 mg now.  3. Access: Bilateral CFA, right CFV  4. Pt is a JARED candidate and understands the importance of taking plavix for at least one year, understands that in case of receiving a drug coated stent the failure to comply with dual anti-platelet therapy is likely to result in stent clothing, heart attack and death.   5. The risks, benefits, and alternatives of coronary vascular angiography, unprotected left main intervention with impella support and right heart catheterization. All questions were answered and informed consent was obtained. I had a detailed discussion with the patient regarding risk of stroke, MI, bleeding access site complications including limb loss, allergy, kidney failure including dialysis and death.  6. The patient understands the risks and benefits and wishes to go ahead with the procedure.  7. All patient's questions were answered  8. The patient also provided consent for video recording of the procedure, for academic purposes.

## 2020-06-25 NOTE — BRIEF OP NOTE
"Post Cath Note  Referring Physician: Isiah Rosado MD  Procedure: INSERTION, IMPELLA (Bilateral), IVUS, Coronary, INSERTION, CATHETER, RIGHT HEART (Right)       Access: Bilateral CFA, Right CFV    LVEDP 13 mmHg. Coronary angiography revealed ostial left main coronary artery stenosis confirmed by IVUS. Right heart catheterization also performed.    See full report for further details    Intervention:     Successful rota-PCI of calcified left main stenosis with Impella support, JARED x 1.    Closure device: Perclosure and Mynx    Post Cath Exam:   /64 (BP Location: Right arm, Patient Position: Lying)   Pulse (!) 51   Temp 96.1 °F (35.6 °C) (Oral)   Resp 18   Ht 5' 7" (1.702 m)   Wt 72.6 kg (160 lb)   SpO2 96%   BMI 25.06 kg/m²   No unusual pain, hematoma, thrill or bruit at vascular access site.  Distal pulse present without signs of ischemia.    Recommendations:   - Routine post-cath care  - IVF at 150 cc/hr for 4 hrs  - Cardiac rehab referral, Continue medical management, Risk factor reduction, Follow-up with outpatient cardiologist  - DAPT for one month, then stop aspirin and continue plavix  - Resume eliquis tomorrow  - will observe in ICU overnight    Danial Choudhury MD  PGY-V      "

## 2020-06-25 NOTE — PLAN OF CARE
Problem: Adult Inpatient Plan of Care  Goal: Plan of Care Review  Outcome: Ongoing, Progressing  Flowsheets (Taken 6/25/2020 1510)  Plan of Care Reviewed With:   patient   spouse  Goal: Patient-Specific Goal (Individualization)  Outcome: Ongoing, Progressing  Flowsheets (Taken 6/25/2020 1510)  Individualized Care Needs: Keep pt lying flat per post-cath protocol  Anxieties, Fears or Concerns: Concerned about his low EF  Patient-Specific Goals (Include Timeframe):   No evidence of bleeding from cath sites   no chest pain or complaints of SOB  Goal: Absence of Hospital-Acquired Illness or Injury  Outcome: Ongoing, Progressing  Goal: Optimal Comfort and Wellbeing  Outcome: Ongoing, Progressing     Problem: Fall Injury Risk  Goal: Absence of Fall and Fall-Related Injury  Outcome: Ongoing, Progressing     Problem: Infection  Goal: Infection Symptom Resolution  Outcome: Ongoing, Progressing     CMICU DAILY GOALS     A: Awake    RASS: Goal - 0 awake and alert  Actual - 0 awake and alert   Restraint necessity: No  B: Breath   SBT: Not intubated   C: Coordinate A & B, analgesics/sedatives   Pain: managed    SAT: Not intubated  D: Delirium   CAM-ICU: Overall CAM-ICU: Negative  E: Early Mobility   MOVE Screen: Pass   Activity: Activity Management: bedrest maintained per order  FAS: Feeding/Nutrition   Diet order: Diet/Nutrition Received: 2 gram sodium, low saturated fat/low cholesterol  T: Thrombus   DVT prophylaxis: VTE Required Core Measure: Pharmacological prophylaxis initiated/maintained  H: HOB Elevation   Head of Bed (HOB): HOB flat  U: Ulcer Prophylaxis   GI: no  G: Glucose control   managed    S: Skin   Bundle compliance: yes     Date: Unknown  B: Bowel Function   diarrhea   I: Indwelling Catheters   Meza necessity: No   CVC necessity: No   IPAD offered: Not appropriate  D: De-escalation Antibx   No abx  Plan for the day  No evidence of bleeding from cath sites; no chest pain or complaints of SOB  Family/Goals of  care/Code Status   Code Status: No Order     Pt AAOx4; NS gtt infusing at this time. Pt oxygenating on room air. Bilateral groin sites intact with no complications. Pt bradycardic; MD aware and stated this is pt's baseline. Home milrinone infusing per pt's home infusion pump. Plan to discharge pt home tomorrow given no complications overnight. No additional events, VS and assessment per flow sheet, patient progressing towards goals as tolerated, plan of care reviewed with Juan Alberto Benavides and family, all concerns addressed, will continue to monitor.

## 2020-06-25 NOTE — Clinical Note
The PA catheter is inserted into the main pulmonary artery. Hemodynamics performed. O2 saturation measured at 63%.

## 2020-06-25 NOTE — TELEPHONE ENCOUNTER
Pre-txp radha Ramesh informed SW pt is on home Milrinone which was initiated at [facility], and pt is now txferring care to [facility]/HTS team & orders for home infusion & HH will be followed by Dr. Barr/HTS team moving forward. SW spoke with pt's wife Sharyn by phone ([phone]), as pt is currently in procedure room with Dr. OTIS Rosado. Wife confirms that pt does not plan to return to cardiology team at [facility] and that pt would like to be followed at [facility] now. Wife reports pt's infusion provider is [facility] in [place] & HH company is [facility]. Wife agreeable to SW making calls to both agencies to change following provider to Dr. Barr.    JAMAAL spoke with Jessica Barrow (pharmacist) at [facility] in [place] (ph: [phone], f: [phone]) and spoke with [facility] office (ph: [phone], f: [phone]). Both agencies report they do NOT need new orders to change pt's following provider. Both agencies entered Dr. Barr as new following provider and entered contact info for HTS team. Danial with [facility] reports their dosing weight for pt is 77kg and pt receives 26mg of Milrinone per day.    JAMAAL updated BISHOP Ramehs that following provider has been updated with both agencies. SW remains available.

## 2020-06-25 NOTE — NURSING
Contacted Inpatient Pharmacy to inquire about pt's home milrinone IV pump that remains infusing during his hospitalization. Per Inpatient Pharmacy, the home pump should be discontinued and hospital-provided milrinone should be administered during hospital stay. Directed to request milrinone order from primary team. Primary team called; per MD Jose, pt may continue home milrinone infusion via home IV pump, as discharge is anticipated tomorrow.
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
## 2020-06-26 VITALS
WEIGHT: 160 LBS | OXYGEN SATURATION: 95 % | TEMPERATURE: 98 F | HEIGHT: 67 IN | SYSTOLIC BLOOD PRESSURE: 147 MMHG | BODY MASS INDEX: 25.11 KG/M2 | DIASTOLIC BLOOD PRESSURE: 59 MMHG | RESPIRATION RATE: 19 BRPM | HEART RATE: 70 BPM

## 2020-06-26 LAB
ANION GAP SERPL CALC-SCNC: 9 MMOL/L (ref 8–16)
BASOPHILS # BLD AUTO: 0.05 K/UL (ref 0–0.2)
BASOPHILS NFR BLD: 0.6 % (ref 0–1.9)
BUN SERPL-MCNC: 25 MG/DL (ref 8–23)
CALCIUM SERPL-MCNC: 8.5 MG/DL (ref 8.7–10.5)
CHLORIDE SERPL-SCNC: 106 MMOL/L (ref 95–110)
CO2 SERPL-SCNC: 25 MMOL/L (ref 23–29)
CREAT SERPL-MCNC: 1.2 MG/DL (ref 0.5–1.4)
DIFFERENTIAL METHOD: ABNORMAL
EOSINOPHIL # BLD AUTO: 0.1 K/UL (ref 0–0.5)
EOSINOPHIL NFR BLD: 1.6 % (ref 0–8)
ERYTHROCYTE [DISTWIDTH] IN BLOOD BY AUTOMATED COUNT: 17.9 % (ref 11.5–14.5)
EST. GFR  (AFRICAN AMERICAN): >60 ML/MIN/1.73 M^2
EST. GFR  (NON AFRICAN AMERICAN): >60 ML/MIN/1.73 M^2
GLUCOSE SERPL-MCNC: 91 MG/DL (ref 70–110)
HCT VFR BLD AUTO: 32.5 % (ref 40–54)
HGB BLD-MCNC: 10.5 G/DL (ref 14–18)
IMM GRANULOCYTES # BLD AUTO: 0.02 K/UL (ref 0–0.04)
IMM GRANULOCYTES NFR BLD AUTO: 0.3 % (ref 0–0.5)
LYMPHOCYTES # BLD AUTO: 2.4 K/UL (ref 1–4.8)
LYMPHOCYTES NFR BLD: 29.6 % (ref 18–48)
MAGNESIUM SERPL-MCNC: 1.8 MG/DL (ref 1.6–2.6)
MCH RBC QN AUTO: 27.7 PG (ref 27–31)
MCHC RBC AUTO-ENTMCNC: 32.3 G/DL (ref 32–36)
MCV RBC AUTO: 86 FL (ref 82–98)
MONOCYTES # BLD AUTO: 1 K/UL (ref 0.3–1)
MONOCYTES NFR BLD: 12 % (ref 4–15)
NEUTROPHILS # BLD AUTO: 4.5 K/UL (ref 1.8–7.7)
NEUTROPHILS NFR BLD: 55.9 % (ref 38–73)
NRBC BLD-RTO: 0 /100 WBC
PLATELET # BLD AUTO: 302 K/UL (ref 150–350)
PMV BLD AUTO: 9.8 FL (ref 9.2–12.9)
POC ACTIVATED CLOTTING TIME K: 213 SEC (ref 74–137)
POC ACTIVATED CLOTTING TIME K: 230 SEC (ref 74–137)
POC ACTIVATED CLOTTING TIME K: 274 SEC (ref 74–137)
POC ACTIVATED CLOTTING TIME K: 274 SEC (ref 74–137)
POTASSIUM SERPL-SCNC: 3.7 MMOL/L (ref 3.5–5.1)
RBC # BLD AUTO: 3.79 M/UL (ref 4.6–6.2)
SAMPLE: ABNORMAL
SODIUM SERPL-SCNC: 140 MMOL/L (ref 136–145)
WBC # BLD AUTO: 7.97 K/UL (ref 3.9–12.7)

## 2020-06-26 PROCEDURE — 83735 ASSAY OF MAGNESIUM: CPT | Mod: NTX

## 2020-06-26 PROCEDURE — 25000003 PHARM REV CODE 250: Mod: NTX | Performed by: INTERNAL MEDICINE

## 2020-06-26 PROCEDURE — 80048 BASIC METABOLIC PNL TOTAL CA: CPT | Mod: NTX

## 2020-06-26 PROCEDURE — 36620 INSERTION CATHETER ARTERY: CPT | Mod: NTX

## 2020-06-26 PROCEDURE — 85025 COMPLETE CBC W/AUTO DIFF WBC: CPT | Mod: NTX

## 2020-06-26 RX ORDER — POTASSIUM CHLORIDE 20 MEQ/1
40 TABLET, EXTENDED RELEASE ORAL ONCE
Status: COMPLETED | OUTPATIENT
Start: 2020-06-26 | End: 2020-06-26

## 2020-06-26 RX ADMIN — POTASSIUM CHLORIDE 40 MEQ: 1500 TABLET, EXTENDED RELEASE ORAL at 09:06

## 2020-06-26 RX ADMIN — ATORVASTATIN CALCIUM 40 MG: 20 TABLET, FILM COATED ORAL at 09:06

## 2020-06-26 RX ADMIN — CLOPIDOGREL BISULFATE 75 MG: 75 TABLET ORAL at 09:06

## 2020-06-26 RX ADMIN — LEVOTHYROXINE SODIUM 100 MCG: 100 TABLET ORAL at 06:06

## 2020-06-26 RX ADMIN — AMIODARONE HYDROCHLORIDE 200 MG: 200 TABLET ORAL at 09:06

## 2020-06-26 RX ADMIN — ASPIRIN 81 MG: 81 TABLET, COATED ORAL at 09:06

## 2020-06-26 NOTE — SIGNIFICANT EVENT
Called by nurse for SBP in 70's and pt felt dizziness after eating. SBP had been in 120's. Small plum size hematoma to L groin access site. Manual pressure held for 30min and hematoma reduced. No abd or groin tenderness to suggest retroperitoneal bleed. Echo with no effusion and IVC collapsible. SBP improved with 500cc bolus. Hgb down about 1g which is not unexpected from procedure and post procedure angio of L CFA access site showed excellent hemostasis with preclose devices. Jovita placed and will continue to monntor BP. May have been vagal response vs hypovolemia. R arm BP also 20mmhg lower than all other extremities. Repeat CBC 2300

## 2020-06-26 NOTE — DISCHARGE SUMMARY
[facility]  Interventional Cardiology  Discharge Summary      Patient Name: Juan Alberto Benavides  MRN: [id number]  Admission Date: 6/25/2020  Hospital Length of Stay: 1 days  Discharge Date and Time:  06/26/2020 11:11 AM  Attending Physician: Isiah Rosado MD  Discharging Provider: Danial Choudhury MD  Primary Care Physician: Lexis Grimaldo,     HPI:  Mr. Benavides is a 68 y/o gentleman with PMH chemotherapy induced cardiomyopathy, with subsequent improvement of LVEF and then recurrent HFrEF with EF dropping to 10%, later found to have CAD with left main disease. He now has severe HFrEF and is on a home milrinone infusion. His initial heart failure symptoms included LOPEZ, orthopnea and PND but since beginning his milrinone therapy and adhering to medications and lifestyle recommendations the patient feels symptomatically improved. He denies any history of chest pain/pressure/tightness/discomfort, palpitations, syncope or claudication symptoms. He previously drank a 12 pack of 16 oz beers daily, which he has stopped completely 3 months ago. He stopped eliquis 3 days ago, has started plavix with a 600 mg load and is taking 75 mg daily. He only began taking 81 mg ASA daily starting yesterday.      Procedure(s) (LRB):  INSERTION, IMPELLA (Bilateral)  IVUS, Coronary  INSERTION, CATHETER, RIGHT HEART (Right)  Stent, Drug Eluting, Single Vessel, Coronary     Indwelling Lines/Drains at time of discharge:  Lines/Drains/Airways     Peripherally Inserted Central Catheter Line            PICC Double Lumen left brachial -- days                Hospital Course:  Mr. Benavides underwent successful rota-PCI of calcified left main stenosis with Impella support and received JARED x 1 to the ostial left main. He was monitored in the hospital overnight and was noted to develop a left inguinal hematoma which resolved with manual pressure. He was monitored overnight and remained in stable condition. He was ambulating  well, tolerating PO, creatinine was stable and H/H remained stable overnight, after an initial drop following the procedure. He was discharged home in stable condition. He was instructed to stop taking aspirin after 30 days.        Significant Diagnostic Studies:   Recent Results (from the past 24 hour(s))   ISTAT ACT-K    Collection Time: 06/25/20 12:47 PM   Result Value Ref Range    POC ACTIVATED CLOTTING TIME K 230 (H) 74 - 137 sec    Sample ARTERIAL    ISTAT ACT-K    Collection Time: 06/25/20  1:22 PM   Result Value Ref Range    POC ACTIVATED CLOTTING TIME K 274 (H) 74 - 137 sec    Sample ARTERIAL    ISTAT ACT-K    Collection Time: 06/25/20  1:37 PM   Result Value Ref Range    POC ACTIVATED CLOTTING TIME K 274 (H) 74 - 137 sec    Sample ARTERIAL    Digoxin level    Collection Time: 06/25/20  4:04 PM   Result Value Ref Range    Digoxin Lvl 0.5 (L) 0.8 - 2.0 ng/mL   CBC auto differential    Collection Time: 06/25/20  6:24 PM   Result Value Ref Range    WBC 9.68 3.90 - 12.70 K/uL    RBC 4.32 (L) 4.60 - 6.20 M/uL    Hemoglobin 12.0 (L) 14.0 - 18.0 g/dL    Hematocrit 37.4 (L) 40.0 - 54.0 %    Mean Corpuscular Volume 87 82 - 98 fL    Mean Corpuscular Hemoglobin 27.8 27.0 - 31.0 pg    Mean Corpuscular Hemoglobin Conc 32.1 32.0 - 36.0 g/dL    RDW 18.4 (H) 11.5 - 14.5 %    Platelets 327 150 - 350 K/uL    MPV 9.8 9.2 - 12.9 fL    Immature Granulocytes 0.4 0.0 - 0.5 %    Gran # (ANC) 5.2 1.8 - 7.7 K/uL    Immature Grans (Abs) 0.04 0.00 - 0.04 K/uL    Lymph # 3.3 1.0 - 4.8 K/uL    Mono # 1.0 0.3 - 1.0 K/uL    Eos # 0.1 0.0 - 0.5 K/uL    Baso # 0.06 0.00 - 0.20 K/uL    nRBC 0 0 /100 WBC    Gran% 53.7 38.0 - 73.0 %    Lymph% 33.7 18.0 - 48.0 %    Mono% 10.3 4.0 - 15.0 %    Eosinophil% 1.3 0.0 - 8.0 %    Basophil% 0.6 0.0 - 1.9 %    Differential Method Automated    CBC auto differential    Collection Time: 06/25/20 10:36 PM   Result Value Ref Range    WBC 7.97 3.90 - 12.70 K/uL    RBC 3.80 (L) 4.60 - 6.20 M/uL    Hemoglobin  10.5 (L) 14.0 - 18.0 g/dL    Hematocrit 32.5 (L) 40.0 - 54.0 %    Mean Corpuscular Volume 86 82 - 98 fL    Mean Corpuscular Hemoglobin 27.6 27.0 - 31.0 pg    Mean Corpuscular Hemoglobin Conc 32.3 32.0 - 36.0 g/dL    RDW 17.9 (H) 11.5 - 14.5 %    Platelets 307 150 - 350 K/uL    MPV 10.3 9.2 - 12.9 fL    Immature Granulocytes 0.3 0.0 - 0.5 %    Gran # (ANC) 5.5 1.8 - 7.7 K/uL    Immature Grans (Abs) 0.02 0.00 - 0.04 K/uL    Lymph # 1.6 1.0 - 4.8 K/uL    Mono # 0.8 0.3 - 1.0 K/uL    Eos # 0.0 0.0 - 0.5 K/uL    Baso # 0.05 0.00 - 0.20 K/uL    nRBC 0 0 /100 WBC    Gran% 68.3 38.0 - 73.0 %    Lymph% 20.2 18.0 - 48.0 %    Mono% 10.2 4.0 - 15.0 %    Eosinophil% 0.4 0.0 - 8.0 %    Basophil% 0.6 0.0 - 1.9 %    Differential Method Automated    CBC auto differential    Collection Time: 06/26/20  5:42 AM   Result Value Ref Range    WBC 7.97 3.90 - 12.70 K/uL    RBC 3.79 (L) 4.60 - 6.20 M/uL    Hemoglobin 10.5 (L) 14.0 - 18.0 g/dL    Hematocrit 32.5 (L) 40.0 - 54.0 %    Mean Corpuscular Volume 86 82 - 98 fL    Mean Corpuscular Hemoglobin 27.7 27.0 - 31.0 pg    Mean Corpuscular Hemoglobin Conc 32.3 32.0 - 36.0 g/dL    RDW 17.9 (H) 11.5 - 14.5 %    Platelets 302 150 - 350 K/uL    MPV 9.8 9.2 - 12.9 fL    Immature Granulocytes 0.3 0.0 - 0.5 %    Gran # (ANC) 4.5 1.8 - 7.7 K/uL    Immature Grans (Abs) 0.02 0.00 - 0.04 K/uL    Lymph # 2.4 1.0 - 4.8 K/uL    Mono # 1.0 0.3 - 1.0 K/uL    Eos # 0.1 0.0 - 0.5 K/uL    Baso # 0.05 0.00 - 0.20 K/uL    nRBC 0 0 /100 WBC    Gran% 55.9 38.0 - 73.0 %    Lymph% 29.6 18.0 - 48.0 %    Mono% 12.0 4.0 - 15.0 %    Eosinophil% 1.6 0.0 - 8.0 %    Basophil% 0.6 0.0 - 1.9 %    Differential Method Automated    Basic metabolic panel    Collection Time: 06/26/20  5:42 AM   Result Value Ref Range    Sodium 140 136 - 145 mmol/L    Potassium 3.7 3.5 - 5.1 mmol/L    Chloride 106 95 - 110 mmol/L    CO2 25 23 - 29 mmol/L    Glucose 91 70 - 110 mg/dL    BUN, Bld 25 (H) 8 - 23 mg/dL    Creatinine 1.2 0.5 - 1.4 mg/dL     Calcium 8.5 (L) 8.7 - 10.5 mg/dL    Anion Gap 9 8 - 16 mmol/L    eGFR if African American >60.0 >60 mL/min/1.73 m^2    eGFR if non African American >60.0 >60 mL/min/1.73 m^2   Magnesium    Collection Time: 06/26/20  5:42 AM   Result Value Ref Range    Magnesium 1.8 1.6 - 2.6 mg/dL         Pending Diagnostic Studies:     Procedure Component Value Units Date/Time    EKG 12-LEAD on arrival to floor [[id number]]     Order Status: Sent Lab Status: No result           Discharged Condition: fair    Follow Up:  Follow-up Information     Isiah Rosado MD In 2 weeks.    Specialty: INTERVENTIONAL CARDIOLOGY  Contact information:  [facility]  [facility] [address] [phone]                 Patient Instructions:      Diet Cardiac     Weight bearing restrictions (specify):   Order Comments: Do not lift over 5-10 pounds for one week     Medications:  Reconciled Home Medications:      Medication List      CONTINUE taking these medications    amiodarone 200 MG Tab  Commonly known as: PACERONE  Take 200 mg by mouth 2 (two) times a day.     aspirin 81 MG EC tablet  Commonly known as: ECOTRIN  Take 1 tablet (81 mg total) by mouth once daily.     atorvastatin 40 MG tablet  Commonly known as: LIPITOR  Take 40 mg by mouth once daily.     clopidogreL 75 mg tablet  Commonly known as: PLAVIX  Take 600mg po X 1 and then 75mg po qday     digoxin 125 mcg tablet  Commonly known as: LANOXIN  Take 1 tablet (125 mcg total) by mouth every Mon, Wed, Fri.     ELIQUIS 5 mg Tab  Generic drug: apixaban  Take 5 mg by mouth 2 (two) times a day.     famotidine 40 MG tablet  Commonly known as: PEPCID  Take 40 mg by mouth every evening.     levothyroxine 100 MCG tablet  Commonly known as: SYNTHROID  Take 100 mcg by mouth every morning.     metoprolol tartrate 25 MG tablet  Commonly known as: LOPRESSOR  Take 25 mg by mouth once daily.     milrinone 1 mg/mL injection  Commonly known as: PRIMACOR  milrinone 1 mg/mL intravenous solution    Inject by intravenous route.     nitroGLYCERIN 0.4 MG SL tablet  Commonly known as: NITROSTAT  Place 0.4 mg under the tongue every 5 (five) minutes as needed for Chest pain.     omeprazole 40 MG capsule  Commonly known as: PRILOSEC  Take 40 mg by mouth once daily.     torsemide 100 MG Tab  Commonly known as: DEMADEX  Take half a tablet once a day     traZODone 50 MG tablet  Commonly known as: DESYREL  Take 50 mg by mouth.            Time spent on the discharge of patient: 30 minutes    Danial Choudhury MD  Interventional Cardiology  [facility]
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
## 2020-06-26 NOTE — CARE UPDATE
CM[facility] DAILY GOALS       A: Awake    RASS: Goal -    Actual - RASS (Saleem Agitation-Sedation Scale): 0-->alert and calm   Restraint necessity:    B: Breath   SBT: NA   C: Coordinate A & B, analgesics/sedatives   Pain: managed    SAT: NA  D: Delirium   CAM-ICU: Overall CAM-ICU: Negative  E: Early Mobility   MOVE Screen: Pass   Activity: Activity Management: bedrest maintained per order  FAS: Feeding/Nutrition   Diet order: Diet/Nutrition Received: low saturated fat/low cholesterol, 2 gram sodium,   Fluid restriction:    T: Thrombus   DVT prophylaxis: VTE Required Core Measure: Pharmacological prophylaxis initiated/maintained  H: HOB Elevation   Head of Bed (HOB): HOB at 30 degrees  U: Ulcer Prophylaxis   GI: no  G: Glucose control   managed    S: Skin   Bundle compliance: yes   Bathing/Skin Care: patient refused, bath, complete Date: 6/25 AM shift   B: Bowel Function   no issues   I: Indwelling Catheters   Meza necessity:     CVC necessity: Yes   IPAD offered: No  D: De-escalation Antibx   Meds per MAR   Plan for the day    Family/Goals of care/Code Status   Code Status: No Order    Pt required A. Line placement and 2 (250ml) NS bolus for hypotension. at shift change. Throughout shift pt BP has stayed above goal which is SBP > 100. Pt has bruising to groin sites bilaterally from high risk PCI done 6/25, team aware, that have not increased/spread. Pt bradycardic, team aware.    No acute events throughout day, VS and assessment per flow sheet, patient progressing towards goals as tolerated, plan of care reviewed with Juan Alberto Benavides, all concerns addressed, will continue to monitor.

## 2020-06-26 NOTE — PLAN OF CARE
No acute events throughout day, VS and assessment per flow sheet, see below for updates:    Pulmonary: Tolerating RA entire shift. SPO2 WNL.    Cardiovascular: SB without ectopy. HR maintained in 50s. BP stable, no hypotensive episodes noted. Bruising to antonette groin sites s/p PCI stable, no worsening noted, area marked.    Neurological: AAOx4, CLEMENTE, at baseline neuro status.     Gastrointestinal: Tolerating p.o. diet.    Genitourinary: Voiding per urinal without discomfort.    Endocrine: K+ replaced with 40 mEq KCL p.o. x1.    Skin/Bath: No new skin breakdown notes, see flowsheets  Date of last CHG bath given: Refused bath, states will bathe at home once discharged.    Infusions: Home milrinone gtt via pump and PICC    Patient progressing towards goals as tolerated, plan of care communicated and reviewed with Juan Alberto Benavides and family, all concerns addressed, will continue to monitor. Toleratined OOB to chair and ambulating in room. No lightheadedness, dizziness, or other adverse symptoms noted. Waiting on d/c instructions and orders from Interventional Cardiology team. See notes, orders, labs, and flowsheets for additional information.    Parvin Christensen RN

## 2020-06-26 NOTE — PROCEDURES
"Juan Alberto Benavides is a 69 y.o. male patient.    Temp: 97 °F (36.1 °C) (06/25/20 1500)  Pulse: (!) 47 (06/25/20 1810)  Resp: (!) 21 (06/25/20 1810)  BP: (!) 78/52 (06/25/20 1810)  SpO2: 97 % (06/25/20 1810)  Weight: 72.6 kg (160 lb) (06/25/20 1030)  Height: 5' 7" (170.2 cm) (06/25/20 1030)  Mallampati Scale: Class II  ASA Classification: Class 3    Arterial Line    Date/Time: 6/25/2020 7:39 PM  Performed by: Bob Garcia MD  Authorized by: Bob Garcia MD   Indications: hemodynamic monitoring  Location: left radial  Zaire's test normal: yes  Needle gauge: 20  Number of attempts: 1  Technical procedures used: US guidance  Post-procedure: line sutured  Post-procedure CMS: normal  Patient tolerance: Patient tolerated the procedure well with no immediate complications          Bob Garcia  6/25/2020  "

## 2020-06-26 NOTE — PHYSICIAN QUERY
PT Name: Juan Alberto Benavides  MR #: [id number]  Physician Query Form - Hematology Clarification     CDS/: Erin Romano RN, CCDS             Contact information: [email or web address]    This form is a permanent document in the medical record.     Query Date: June 26, 2020    By submitting this query, we are merely seeking further clarification of documentation. Please utilize your independent clinical judgment when addressing the question(s) below.    The medical record contains the following:    Indicators Supporting Clinical Findings Location in Medical Record   X Surgery or Procedure performed:   Estimated Blood Loss (EBL): RHC, Successful PCI of 75% LM stenosis with 4.5X12 JARED  Impella   6/25 cath procedure    X Bleeding documented MD called and notified of suspected bleed. 250 cc NS bolus administered and CBC collected 6/25 nurse notes    Hemorrhage documented     X Hematoma documented Pt became hypotensive; groins sites assessed. New hematoma present at L groin site.    Small plum size hematoma to L groin access site. Manual pressure held for 30min and hematoma reduced.  6/25 nurse note        6/25 significant event    Evacuation of hematoma documented     X Anticoagulants:  Home med:  eliquis 5 mg po 2 x daily    He stopped eliquis 3 days ago, has started plavix with a 600 mg load and is taking 75 mg daily. He only began taking 81 mg ASA daily starting yesterday. 6/25 h/p    Transfusions / Blood Products:      Treatments:      Other:       Please clarify if the L Groin Hematoma is:   (Matheus all that apply)                 [   ]   Inherent to the procedure               [   ]   Routinely expected                [   ]   Complication of procedure               [   ]   Due to medication (specify):_________________________________               [   ]   Other: ____________________________               [  X ]   Clinically insignificant                                          Please document in your  progress notes daily for the duration of treatment, until resolved, and include in your discharge summary.

## 2020-06-26 NOTE — HOSPITAL COURSE
Mr. Benavides underwent successful rota-PCI of calcified left main stenosis with Impella support and received JARED x 1 to the ostial left main. He was monitored in the hospital overnight and was noted to develop a left inguinal hematoma which resolved with manual pressure. He was monitored overnight and remained in stable condition. He was ambulating well, tolerating PO, creatinine was stable and H/H remained stable overnight, after an initial drop following the procedure. He was discharged home in stable condition.

## 2020-06-26 NOTE — NURSING
Contacted [facility]. regarding pt H/H of 10.5/32.5 down from admit H/H 13.4/42.2. No new orders. WCTM.

## 2020-06-26 NOTE — PHYSICIAN QUERY
PT Name: Juan Alberto Benavides  MR #: [id number]    HEMATOLOGY CLARIFICATION      CDS/: Erin Romano RN, CCDS             Contact information: [email or web address]    This form is a permanent document in the medical record.      Query Date: June 26, 2020    By submitting this query, we are merely seeking further clarification of documentation. Please utilize your independent clinical judgment when addressing the question(s) below.    The Medical Record contains the following:   Indicators  Supporting Clinical Findings Location in Medical Record    Anemia documented     X H&H 13.4/42.2-->10.5/32.5    Hgb down about 1g which is not unexpected from procedure  Repeat cbc 2300 6/25, 6/26 lab    6/25 significant event note   X BP                    HR Called by nurse for SBP in 70's and pt felt dizziness  6/25 significant event note    GI bleeding documented     X Acute bleeding (Non GI site) New hematoma present at L groin site    Small plum size hematoma to L groin access site 6/25 nurse note    6/25 significant event note    Transfusion(s)     X Acute/Chronic illness HFrEF, cad 6/25 h/p    Treatments      Other       Provider, please specify diagnosis  associated with above clinical findings.   [   ] Acute blood loss anemia    [ X  ] Acute blood loss anemia expected post-operatively    [   ] Anemia, unspecified    [   ] Other Hematological Diagnosis (please specify): _________________   [   ] Clinically Undetermined  In addition patient received 2 L NS post-procedure resulting in dilutional effect.     Present on admission (POA) status:   [   ] Yes (Y)                          [  ] Clinically Undetermined (W)  [ X  ] No (N)                            [   ] Documentation insufficient to determine if condition is POA (U)          Please document in your progress notes daily for the duration of treatment, until resolved, and include in your discharge summary.

## 2020-06-29 ENCOUNTER — TELEPHONE (OUTPATIENT)
Dept: TRANSPLANT | Facility: CLINIC | Age: 70
End: 2020-06-29

## 2020-06-29 NOTE — TELEPHONE ENCOUNTER
I received a call from Sandi with [facility] re pt PICC line- reports is unable to consistently draw blood from the PICC, and pt does not want to be stuck for labs.  Sandi will check with the nursing staff to determine whether they can trouble shoot the line there, and someone will be in touch with me.

## 2020-06-30 ENCOUNTER — TELEPHONE (OUTPATIENT)
Dept: TRANSPLANT | Facility: CLINIC | Age: 70
End: 2020-06-30

## 2020-06-30 NOTE — TELEPHONE ENCOUNTER
I was able to reach Mr Benavides.  He is very concerned about his PICC line and whether he needs FU with Dr Barr. I advised Mr Benavides of the followin) Dr Barr and Dr DEEPTI Rosado will review results of last week's procedure  2) Will plan on scheduling PICC asseessment/reinsertion, etc in IR when pt returns here on  to see Dr DEEPTI Rosado per pt scheduling request    I let Mr Benavides know that once the team has determined a plan, we will be in touch to review with him.Pt is very grateful for the concern and care we are showing him.  I notified MD electronically.

## 2020-06-30 NOTE — TELEPHONE ENCOUNTER
I called Mr Benavides- was unable to leave a message as VM is not set up.  I spoke with [facility] yesterday and asked whether their nurse can troubleshoot the PICC line; I have not heard back.

## 2020-06-30 NOTE — TELEPHONE ENCOUNTER
----- Message from Angie Cleary sent at 6/30/2020 10:40 AM CDT -----  Regarding: returning a call to Celine  Contact: patient  The Pt is calling to speak with Celine. Please call him back @ [phone]. Thanks, Angie

## 2020-07-01 ENCOUNTER — PATIENT OUTREACH (OUTPATIENT)
Dept: ADMINISTRATIVE | Facility: CLINIC | Age: 70
End: 2020-07-01

## 2020-07-01 DIAGNOSIS — I50.22 CHRONIC SYSTOLIC CONGESTIVE HEART FAILURE: Primary | ICD-10-CM

## 2020-07-01 DIAGNOSIS — N18.3 ACUTE RENAL FAILURE SUPERIMPOSED ON STAGE 3 CHRONIC KIDNEY DISEASE, UNSPECIFIED ACUTE RENAL FAILURE TYPE: ICD-10-CM

## 2020-07-01 DIAGNOSIS — I25.10 CORONARY ARTERY DISEASE INVOLVING NATIVE CORONARY ARTERY OF NATIVE HEART WITHOUT ANGINA PECTORIS: ICD-10-CM

## 2020-07-01 DIAGNOSIS — N17.9 ACUTE RENAL FAILURE SUPERIMPOSED ON STAGE 3 CHRONIC KIDNEY DISEASE, UNSPECIFIED ACUTE RENAL FAILURE TYPE: ICD-10-CM

## 2020-07-01 NOTE — PATIENT INSTRUCTIONS
Discharge Instructions for Cardiomyopathy  Cardiomyopathy means that your heart is not working as it normally should. This condition can make it more difficult to do things that may have been easy for you in the past. But with proper treatment and some lifestyle changes, you and your healthcare provider can help your heart do its job.  Home care  Work hard to remove the salt from your diet. Here are tips:  · Limit canned, dried, packaged, and fast foods.  · Dont add salt to your food at the table.  · Season foods with herbs instead of salt when you cook.  · When you eat out, ask that the  not add any salt to your dish.  · Don't eat fried or greasy foods.  · Be careful of bottled beverages. They can contain a lot of salt.  Also check the labels of over-the-counter medicines and supplements. They may be high in sodium. Ask your pharmacist or provider if you need help finding a low-salt product.  Be as active as you can. Ask your healthcare provider how to get started:  · Simple activities such as walking or gardening can help.  · Find activities you enjoy and make them a priority.  · Cardiac rehabilitation programs can help you reach your activity goals. You exercise while staff closely watches the stress on your heart. These programs may be covered by insurance.  Other tips for home care:  · Limit how much fluid you have each day. Your healthcare provider will tell you how much is safe.  · Break the smoking habit. Enroll in a stop-smoking program to improve your chances of success. Join smoking cessation support groups or ask your healthcare provider about nicotine replacement products.  · Take your medicines exactly as directed. Dont skip doses. Dont stop taking your medicines without talking to your healthcare provider first.  · Some over-the-counter medicines and herbal supplements can increase your heart rate or blood pressure. This can put extra stress on your heart. Check with your pharmacist to see if  products are heart-safe and won't interact with other medicines you take.  · Visit your healthcare provider regularly. Mention any problems with your treatment plan. Together you can find a plan that works for you.  · Weigh yourself at the same time each day. The best time is in the morning after you wake up and after urinating. Wear the same clothing each time. Keep a written record of your daily weight.  · Limit how much alcohol you drink. Too much alcohol isn't good for the heart. Healthcare providers advise no more than 1 drink per day for women and 2 drinks per day for men.  Follow-up care  Make a follow-up appointment as directed by our staff.     When to call your healthcare provider  Call your healthcare provider right away if you have any of the following:  · You gain more than 2 pounds in 1 day, more than 5 pounds in 1 week, or whatever weight gain you were told to report by your healthcare provider  · New or increased chest pain that doesn't get better with medicine  · New or increased shortness of breath or coughing  · Weakness in the muscles of your face, arms, or legs  · Trouble speaking  · Rapid pulse or pounding heartbeat  · Fainting, or feeling dizzy or lightheaded  · New or increased swelling in your hands, feet, or ankles   Date Last Reviewed: 2/1/2017  © 2207-7791 [employer]. [address] [place] [address], [place] [address]. All rights reserved. This information is not intended as a substitute for professional medical care. Always follow your healthcare professional's instructions.

## 2020-07-01 NOTE — PROGRESS NOTES
Please forward this important TCC information to your provider in order to maximize the post discharge care delivery of this patient.    C3 nurse spoke with Juan Alberto Benavides for a TCC post hospital discharge follow up call. The patient does not have a scheduled HOSFU appointment with Juan Alberto Benavides within 7-14 days post hospital discharge date 06/26/2020. C3 nurse was unable to schedule HOSFU appointment in [facility].  Please contact pcp  and schedule follow up appointment using HOSFU visit type on or before 06/30/2020.    Respectfully,  Carola Braga LPN    [facility]    [email or web address]       Please do not reply to this message, as this inbox is not routinely monitored.

## 2020-07-09 ENCOUNTER — DOCUMENTATION ONLY (OUTPATIENT)
Dept: TRANSPLANT | Facility: CLINIC | Age: 70
End: 2020-07-09

## 2020-07-09 NOTE — PROGRESS NOTES
Appts scheduled for Thurs July 16.  I called pt to advise, but am unable to leave a message.  Mailed all appt slips to pt

## 2020-07-10 ENCOUNTER — TELEPHONE (OUTPATIENT)
Dept: INTERVENTIONAL RADIOLOGY/VASCULAR | Facility: HOSPITAL | Age: 70
End: 2020-07-10

## 2020-07-15 ENCOUNTER — TELEPHONE (OUTPATIENT)
Dept: CARDIOLOGY | Facility: CLINIC | Age: 70
End: 2020-07-15

## 2020-07-15 NOTE — TELEPHONE ENCOUNTER
Patient called stating he has been running a fever for 3 days and wanted to cancel all of his appointments for 7/16/20. Patient states he will call and reschedule when he knows what is going on. Patient afraid he may have covid. Encouraged patient to call for any concerns.

## 2020-07-16 ENCOUNTER — HOSPITAL ENCOUNTER (OUTPATIENT)
Dept: INTERVENTIONAL RADIOLOGY/VASCULAR | Facility: HOSPITAL | Age: 70
Discharge: HOME | End: 2020-07-16
Attending: INTERNAL MEDICINE
Payer: COMMERCIAL

## 2020-07-18 ENCOUNTER — NURSE TRIAGE (OUTPATIENT)
Dept: ADMINISTRATIVE | Facility: CLINIC | Age: 70
End: 2020-07-18

## 2020-07-18 NOTE — TELEPHONE ENCOUNTER
"I spoke to patient and ask him to go to closest ER    Reason for Disposition   Cracked or broken central line or PICC Line    Additional Information   Negative: Severe difficulty breathing (e.g., struggling for each breath, speaks in single words)   Negative: Shock suspected (e.g., cold/pale/clammy skin, too weak to stand, low BP, rapid pulse)    Protocols used: IV NOT RUNNING OR RUNNING SLOWLY-GILL-MARANDA    Juan Alberto called to say he was in the bathroom this morning, and looked down to see his PICC line on the floor.  He is receiving milrinone infusion via this line. Did not feel anything, did not see what happened.  No bleeding.  His wife applied an occlusive band aid, and they are on the way to [facility] from their home in [place].  States no CP or SOB, says "I feel fantastic, and I don't know what happened for it to fall out".  He does have the entire PICC line, with the milrinone bag, with him to bring to the ED for evaluation.  He states that the PICC line "looks complete to me".  Explained that he needs to be seen immediately, and he states understanding, but no call to 911 per Juan Alberto as he is already on the way.  Encouraged call to 911 for any SOB, CP.  Message to Dr Barr in heart transplant / heart failure clinic, as well as Dr Isiah Rosado.  Of note, he states the PICC was placed at [facility], but he has since received care with [facility] cardiology.  He says he had stent placed at [facility], but has been going to cardiac rehab at [facility].  Called on call Dr Blanca Rosado to report above.  No additional orders received from MD.  Message to Dr Blanca Rosado also.  Please contact caller directly with any additional care advice.    "

## 2020-07-20 ENCOUNTER — TELEPHONE (OUTPATIENT)
Dept: TRANSPLANT | Facility: CLINIC | Age: 70
End: 2020-07-20

## 2020-07-20 NOTE — TELEPHONE ENCOUNTER
I called pt because I was notified of PICC line falling out after his shower this weekend.    Pt states:    Had Pneumonia vaccine, developed fever for 2 days, now resolved.  Had Covid screen on Friday, is self-isolating for 14 days as instructed, but feels great    PICC- went to ER on Saturday, had Picc placed, restarted milrinone.   I let him know that we will reschedule his appts that he missed due to Covid scare, and be in touch.

## 2020-07-28 LAB
EXT ALBUMIN: 4.1
EXT ALT: 89
EXT AST: 56
EXT BUN: 26
EXT CALCIUM: 9.5
EXT CHLORIDE: 98
EXT CREATININE: 1.4 MG/DL
EXT GLUCOSE: 116
EXT POTASSIUM: 4.8
EXT PROTEIN TOTAL: 7
EXT SODIUM: 140 MMOL/L

## 2020-08-04 ENCOUNTER — TELEPHONE (OUTPATIENT)
Dept: TRANSPLANT | Facility: CLINIC | Age: 70
End: 2020-08-04

## 2020-08-04 ENCOUNTER — DOCUMENTATION ONLY (OUTPATIENT)
Dept: TRANSPLANT | Facility: CLINIC | Age: 70
End: 2020-08-04

## 2020-08-04 LAB
ALK PHOS ISOENZYNMES: 125
DIRECT BILIRUBIN: 0.2
EXT ALBUMIN: 4.2
EXT ALT: 103
EXT AST: 63
EXT HEMATOCRIT: 38.5
EXT HEMOGLOBIN: 12.2
EXT PLATELETS: 385
EXT PROTEIN TOTAL: 7
EXT WBC: 8.2
TOTAL BILIRUBIN POC: 0.3

## 2020-08-04 NOTE — TELEPHONE ENCOUNTER
----- Message from Gracie Ivy MA sent at 8/4/2020 11:56 AM CDT -----    ----- Message -----  From: Yamilka Merrill MA  Sent: 8/4/2020  11:22 AM CDT  To: [facility]    Pt has appts on Thurs.8/6 with  with tests. Stated that he spoke  with  on Mon.8/3 and he suggested that he could have his picc line removed but will have to be admitted. Please call

## 2020-08-04 NOTE — TELEPHONE ENCOUNTER
Returned call.  Pt voiced strong desire to have PICC removed if he is able to come off of continuous Milrinone.  Chart reviewed.  It is noted that patient has an echo, lab and appt with Dr. Bearden scheduled on Thurs am, 8/6/20.  Explained to pt that Dr. Bearden will have to review his echo results prior to making decision about stopping Milrinone.    Understanding verbalized.

## 2020-08-06 ENCOUNTER — HOSPITAL ENCOUNTER (OUTPATIENT)
Dept: CARDIOLOGY | Facility: HOSPITAL | Age: 70
Discharge: HOME | End: 2020-08-06
Attending: INTERNAL MEDICINE
Payer: MEDICARE

## 2020-08-06 ENCOUNTER — OFFICE VISIT (OUTPATIENT)
Dept: TRANSPLANT | Facility: CLINIC | Age: 70
End: 2020-08-06
Attending: INTERNAL MEDICINE
Payer: MEDICARE

## 2020-08-06 ENCOUNTER — HOSPITAL ENCOUNTER (OUTPATIENT)
Facility: HOSPITAL | Age: 70
LOS: 1 days | Discharge: HOME | End: 2020-08-08
Attending: INTERNAL MEDICINE | Admitting: INTERNAL MEDICINE
Payer: MEDICARE

## 2020-08-06 ENCOUNTER — OFFICE VISIT (OUTPATIENT)
Dept: CARDIOLOGY | Facility: CLINIC | Age: 70
End: 2020-08-06
Payer: MEDICARE

## 2020-08-06 VITALS
HEART RATE: 64 BPM | DIASTOLIC BLOOD PRESSURE: 74 MMHG | SYSTOLIC BLOOD PRESSURE: 123 MMHG | WEIGHT: 170.19 LBS | BODY MASS INDEX: 26.71 KG/M2 | HEIGHT: 67 IN

## 2020-08-06 VITALS
SYSTOLIC BLOOD PRESSURE: 110 MMHG | HEIGHT: 67 IN | HEART RATE: 60 BPM | BODY MASS INDEX: 25.9 KG/M2 | WEIGHT: 165 LBS | DIASTOLIC BLOOD PRESSURE: 60 MMHG

## 2020-08-06 VITALS
BODY MASS INDEX: 26.61 KG/M2 | HEART RATE: 68 BPM | OXYGEN SATURATION: 96 % | SYSTOLIC BLOOD PRESSURE: 115 MMHG | HEIGHT: 67 IN | WEIGHT: 169.56 LBS | DIASTOLIC BLOOD PRESSURE: 72 MMHG

## 2020-08-06 DIAGNOSIS — I50.22 CHRONIC SYSTOLIC CONGESTIVE HEART FAILURE: ICD-10-CM

## 2020-08-06 DIAGNOSIS — I25.5 CARDIOMYOPATHY, ISCHEMIC: ICD-10-CM

## 2020-08-06 DIAGNOSIS — I50.22 CHRONIC SYSTOLIC CONGESTIVE HEART FAILURE: Primary | ICD-10-CM

## 2020-08-06 DIAGNOSIS — N17.9 ACUTE RENAL FAILURE SUPERIMPOSED ON STAGE 3 CHRONIC KIDNEY DISEASE, UNSPECIFIED ACUTE RENAL FAILURE TYPE: ICD-10-CM

## 2020-08-06 DIAGNOSIS — I48.0 PAROXYSMAL ATRIAL FIBRILLATION: ICD-10-CM

## 2020-08-06 DIAGNOSIS — I25.10 CORONARY ARTERY DISEASE INVOLVING NATIVE CORONARY ARTERY OF NATIVE HEART WITHOUT ANGINA PECTORIS: ICD-10-CM

## 2020-08-06 DIAGNOSIS — N18.3 ACUTE RENAL FAILURE SUPERIMPOSED ON STAGE 3 CHRONIC KIDNEY DISEASE, UNSPECIFIED ACUTE RENAL FAILURE TYPE: ICD-10-CM

## 2020-08-06 DIAGNOSIS — I25.5 CARDIOMYOPATHY, ISCHEMIC: Primary | ICD-10-CM

## 2020-08-06 DIAGNOSIS — N18.4 CKD (CHRONIC KIDNEY DISEASE) STAGE 4, GFR 15-29 ML/MIN: ICD-10-CM

## 2020-08-06 DIAGNOSIS — I25.10 CAD (CORONARY ARTERY DISEASE): ICD-10-CM

## 2020-08-06 DIAGNOSIS — I25.9 ISCHEMIC HEART DISEASE: ICD-10-CM

## 2020-08-06 DIAGNOSIS — N18.30 CKD (CHRONIC KIDNEY DISEASE), STAGE III: ICD-10-CM

## 2020-08-06 LAB
ALBUMIN SERPL BCP-MCNC: 3.7 G/DL (ref 3.5–5.2)
ALP SERPL-CCNC: 116 U/L (ref 55–135)
ALT SERPL W/O P-5'-P-CCNC: 85 U/L (ref 10–44)
ANION GAP SERPL CALC-SCNC: 11 MMOL/L (ref 8–16)
ASCENDING AORTA: 3.88 CM
AST SERPL-CCNC: 47 U/L (ref 10–40)
AV INDEX (PROSTH): 0.79
AV MEAN GRADIENT: 4 MMHG
AV PEAK GRADIENT: 6 MMHG
AV VALVE AREA: 3.59 CM2
AV VELOCITY RATIO: 0.82
BASOPHILS # BLD AUTO: 0.08 K/UL (ref 0–0.2)
BASOPHILS NFR BLD: 0.9 % (ref 0–1.9)
BILIRUB SERPL-MCNC: 0.3 MG/DL (ref 0.1–1)
BNP SERPL-MCNC: 26 PG/ML (ref 0–99)
BSA FOR ECHO PROCEDURE: 1.88 M2
BUN SERPL-MCNC: 34 MG/DL (ref 8–23)
CALCIUM SERPL-MCNC: 9.7 MG/DL (ref 8.7–10.5)
CHLORIDE SERPL-SCNC: 101 MMOL/L (ref 95–110)
CO2 SERPL-SCNC: 27 MMOL/L (ref 23–29)
CREAT SERPL-MCNC: 1.6 MG/DL (ref 0.5–1.4)
CV ECHO LV RWT: 0.35 CM
DIFFERENTIAL METHOD: ABNORMAL
DOP CALC AO PEAK VEL: 1.22 M/S
DOP CALC AO VTI: 22.62 CM
DOP CALC LVOT AREA: 4.5 CM2
DOP CALC LVOT DIAMETER: 2.4 CM
DOP CALC LVOT PEAK VEL: 1 M/S
DOP CALC LVOT STROKE VOLUME: 81.21 CM3
DOP CALCLVOT PEAK VEL VTI: 17.96 CM
E WAVE DECELERATION TIME: 193.66 MSEC
E/A RATIO: 0.78
E/E' RATIO: 9.64 M/S
ECHO LV POSTERIOR WALL: 0.88 CM (ref 0.6–1.1)
EOSINOPHIL # BLD AUTO: 0.3 K/UL (ref 0–0.5)
EOSINOPHIL NFR BLD: 2.9 % (ref 0–8)
ERYTHROCYTE [DISTWIDTH] IN BLOOD BY AUTOMATED COUNT: 18.8 % (ref 11.5–14.5)
EST. GFR  (AFRICAN AMERICAN): 50.8 ML/MIN/1.73 M^2
EST. GFR  (NON AFRICAN AMERICAN): 43.9 ML/MIN/1.73 M^2
FRACTIONAL SHORTENING: 22 % (ref 28–44)
GLUCOSE SERPL-MCNC: 99 MG/DL (ref 70–110)
HCT VFR BLD AUTO: 39.4 % (ref 40–54)
HGB BLD-MCNC: 12.3 G/DL (ref 14–18)
IMM GRANULOCYTES # BLD AUTO: 0.03 K/UL (ref 0–0.04)
IMM GRANULOCYTES NFR BLD AUTO: 0.3 % (ref 0–0.5)
INTERVENTRICULAR SEPTUM: 0.95 CM (ref 0.6–1.1)
LA MAJOR: 4.07 CM
LA MINOR: 4.54 CM
LA WIDTH: 3.65 CM
LEFT ATRIUM SIZE: 4.59 CM
LEFT ATRIUM VOLUME INDEX: 32.8 ML/M2
LEFT ATRIUM VOLUME: 61.12 CM3
LEFT INTERNAL DIMENSION IN SYSTOLE: 3.95 CM (ref 2.1–4)
LEFT VENTRICLE DIASTOLIC VOLUME INDEX: 66.07 ML/M2
LEFT VENTRICLE DIASTOLIC VOLUME: 123.11 ML
LEFT VENTRICLE MASS INDEX: 89 G/M2
LEFT VENTRICLE SYSTOLIC VOLUME INDEX: 36.4 ML/M2
LEFT VENTRICLE SYSTOLIC VOLUME: 67.75 ML
LEFT VENTRICULAR INTERNAL DIMENSION IN DIASTOLE: 5.09 CM (ref 3.5–6)
LEFT VENTRICULAR MASS: 166.59 G
LV LATERAL E/E' RATIO: 7.57 M/S
LV SEPTAL E/E' RATIO: 13.25 M/S
LYMPHOCYTES # BLD AUTO: 2.9 K/UL (ref 1–4.8)
LYMPHOCYTES NFR BLD: 30.8 % (ref 18–48)
MAGNESIUM SERPL-MCNC: 2.2 MG/DL (ref 1.6–2.6)
MCH RBC QN AUTO: 28.1 PG (ref 27–31)
MCHC RBC AUTO-ENTMCNC: 31.2 G/DL (ref 32–36)
MCV RBC AUTO: 90 FL (ref 82–98)
MONOCYTES # BLD AUTO: 1.2 K/UL (ref 0.3–1)
MONOCYTES NFR BLD: 13 % (ref 4–15)
MV PEAK A VEL: 0.68 M/S
MV PEAK E VEL: 0.53 M/S
MV STENOSIS PRESSURE HALF TIME: 56.16 MS
MV VALVE AREA P 1/2 METHOD: 3.92 CM2
NEUTROPHILS # BLD AUTO: 4.9 K/UL (ref 1.8–7.7)
NEUTROPHILS NFR BLD: 52.1 % (ref 38–73)
NRBC BLD-RTO: 0 /100 WBC
PHOSPHATE SERPL-MCNC: 3.4 MG/DL (ref 2.7–4.5)
PISA TR MAX VEL: 2.26 M/S
PLATELET # BLD AUTO: 338 K/UL (ref 150–350)
PMV BLD AUTO: 10.1 FL (ref 9.2–12.9)
POTASSIUM SERPL-SCNC: 4 MMOL/L (ref 3.5–5.1)
PROT SERPL-MCNC: 7.5 G/DL (ref 6–8.4)
PULM VEIN S/D RATIO: 1.56
PV PEAK D VEL: 0.34 M/S
PV PEAK S VEL: 0.53 M/S
RA MAJOR: 3.39 CM
RA PRESSURE: 3 MMHG
RA WIDTH: 2.57 CM
RBC # BLD AUTO: 4.37 M/UL (ref 4.6–6.2)
RIGHT VENTRICULAR END-DIASTOLIC DIMENSION: 3.05 CM
RV TISSUE DOPPLER FREE WALL SYSTOLIC VELOCITY 1 (APICAL 4 CHAMBER VIEW): 11.14 CM/S
SARS-COV-2 RDRP RESP QL NAA+PROBE: NEGATIVE
SINUS: 3.91 CM
SODIUM SERPL-SCNC: 139 MMOL/L (ref 136–145)
STJ: 3.69 CM
TDI LATERAL: 0.07 M/S
TDI SEPTAL: 0.04 M/S
TDI: 0.06 M/S
TR MAX PG: 20 MMHG
TRICUSPID ANNULAR PLANE SYSTOLIC EXCURSION: 1.5 CM
TV REST PULMONARY ARTERY PRESSURE: 23 MMHG
WBC # BLD AUTO: 9.33 K/UL (ref 3.9–12.7)

## 2020-08-06 PROCEDURE — 80053 COMPREHEN METABOLIC PANEL: CPT | Mod: NTX

## 2020-08-06 PROCEDURE — 99999 PR PBB SHADOW E&M-EST. PATIENT-LVL IV: CPT | Mod: PBBFAC,TXP,, | Performed by: INTERNAL MEDICINE

## 2020-08-06 PROCEDURE — 99223 PR INITIAL HOSPITAL CARE,LEVL III: ICD-10-PCS | Mod: AI,NTX,, | Performed by: INTERNAL MEDICINE

## 2020-08-06 PROCEDURE — 93306 TTE W/DOPPLER COMPLETE: CPT | Mod: 26,NTX,, | Performed by: INTERNAL MEDICINE

## 2020-08-06 PROCEDURE — 83880 ASSAY OF NATRIURETIC PEPTIDE: CPT | Mod: NTX

## 2020-08-06 PROCEDURE — 99213 PR OFFICE/OUTPT VISIT, EST, LEVL III, 20-29 MIN: ICD-10-PCS | Mod: S$PBB,NTX,, | Performed by: INTERNAL MEDICINE

## 2020-08-06 PROCEDURE — 83735 ASSAY OF MAGNESIUM: CPT | Mod: NTX

## 2020-08-06 PROCEDURE — 99214 PR OFFICE/OUTPT VISIT, EST, LEVL IV, 30-39 MIN: ICD-10-PCS | Mod: S$PBB,NTX,, | Performed by: INTERNAL MEDICINE

## 2020-08-06 PROCEDURE — 93005 ELECTROCARDIOGRAM TRACING: CPT | Mod: NTX

## 2020-08-06 PROCEDURE — 93010 ELECTROCARDIOGRAM REPORT: CPT | Mod: NTX,,, | Performed by: INTERNAL MEDICINE

## 2020-08-06 PROCEDURE — 25000003 PHARM REV CODE 250: Mod: NTX | Performed by: STUDENT IN AN ORGANIZED HEALTH CARE EDUCATION/TRAINING PROGRAM

## 2020-08-06 PROCEDURE — 99999 PR PBB SHADOW E&M-EST. PATIENT-LVL III: CPT | Mod: PBBFAC,TXP,, | Performed by: INTERNAL MEDICINE

## 2020-08-06 PROCEDURE — 93306 ECHO (CUPID ONLY): ICD-10-PCS | Mod: 26,NTX,, | Performed by: INTERNAL MEDICINE

## 2020-08-06 PROCEDURE — 99214 OFFICE O/P EST MOD 30 MIN: CPT | Mod: PBBFAC,25,27,TXP | Performed by: INTERNAL MEDICINE

## 2020-08-06 PROCEDURE — 93010 EKG 12-LEAD: ICD-10-PCS | Mod: NTX,,, | Performed by: INTERNAL MEDICINE

## 2020-08-06 PROCEDURE — 99213 OFFICE O/P EST LOW 20 MIN: CPT | Mod: S$PBB,NTX,, | Performed by: INTERNAL MEDICINE

## 2020-08-06 PROCEDURE — 99999 PR PBB SHADOW E&M-EST. PATIENT-LVL III: ICD-10-PCS | Mod: PBBFAC,TXP,, | Performed by: INTERNAL MEDICINE

## 2020-08-06 PROCEDURE — 99999 PR PBB SHADOW E&M-EST. PATIENT-LVL IV: ICD-10-PCS | Mod: PBBFAC,TXP,, | Performed by: INTERNAL MEDICINE

## 2020-08-06 PROCEDURE — 99223 1ST HOSP IP/OBS HIGH 75: CPT | Mod: AI,NTX,, | Performed by: INTERNAL MEDICINE

## 2020-08-06 PROCEDURE — 99214 OFFICE O/P EST MOD 30 MIN: CPT | Mod: S$PBB,NTX,, | Performed by: INTERNAL MEDICINE

## 2020-08-06 PROCEDURE — 85025 COMPLETE CBC W/AUTO DIFF WBC: CPT | Mod: NTX

## 2020-08-06 PROCEDURE — 93306 TTE W/DOPPLER COMPLETE: CPT | Mod: TXP

## 2020-08-06 PROCEDURE — 20600001 HC STEP DOWN PRIVATE ROOM: Mod: NTX

## 2020-08-06 PROCEDURE — 99213 OFFICE O/P EST LOW 20 MIN: CPT | Mod: PBBFAC,25,TXP | Performed by: INTERNAL MEDICINE

## 2020-08-06 PROCEDURE — 84100 ASSAY OF PHOSPHORUS: CPT | Mod: NTX

## 2020-08-06 PROCEDURE — U0002 COVID-19 LAB TEST NON-CDC: HCPCS | Mod: TXP

## 2020-08-06 PROCEDURE — 36415 COLL VENOUS BLD VENIPUNCTURE: CPT | Mod: NTX

## 2020-08-06 RX ORDER — LEVOTHYROXINE SODIUM 100 UG/1
100 TABLET ORAL EVERY MORNING
Status: DISCONTINUED | OUTPATIENT
Start: 2020-08-07 | End: 2020-08-08 | Stop reason: HOSPADM

## 2020-08-06 RX ORDER — ATORVASTATIN CALCIUM 20 MG/1
40 TABLET, FILM COATED ORAL DAILY
Status: DISCONTINUED | OUTPATIENT
Start: 2020-08-07 | End: 2020-08-08 | Stop reason: HOSPADM

## 2020-08-06 RX ORDER — METOPROLOL TARTRATE 25 MG/1
25 TABLET, FILM COATED ORAL DAILY
Status: DISCONTINUED | OUTPATIENT
Start: 2020-08-07 | End: 2020-08-08 | Stop reason: HOSPADM

## 2020-08-06 RX ORDER — TRAZODONE HYDROCHLORIDE 50 MG/1
50 TABLET ORAL NIGHTLY
COMMUNITY

## 2020-08-06 RX ORDER — SODIUM CHLORIDE 0.9 % (FLUSH) 0.9 %
10 SYRINGE (ML) INJECTION
Status: DISCONTINUED | OUTPATIENT
Start: 2020-08-06 | End: 2020-08-08 | Stop reason: HOSPADM

## 2020-08-06 RX ORDER — DIGOXIN 125 MCG
125 TABLET ORAL
Status: DISCONTINUED | OUTPATIENT
Start: 2020-08-07 | End: 2020-08-08 | Stop reason: HOSPADM

## 2020-08-06 RX ORDER — ASPIRIN 81 MG/1
81 TABLET ORAL DAILY
Status: DISCONTINUED | OUTPATIENT
Start: 2020-08-07 | End: 2020-08-08 | Stop reason: HOSPADM

## 2020-08-06 RX ORDER — POTASSIUM CHLORIDE 750 MG/1
10 CAPSULE, EXTENDED RELEASE ORAL DAILY
Status: ON HOLD | COMMUNITY
End: 2020-08-07 | Stop reason: HOSPADM

## 2020-08-06 RX ORDER — TRAZODONE HYDROCHLORIDE 50 MG/1
50 TABLET ORAL NIGHTLY PRN
Status: DISCONTINUED | OUTPATIENT
Start: 2020-08-06 | End: 2020-08-08 | Stop reason: HOSPADM

## 2020-08-06 RX ORDER — FAMOTIDINE 20 MG/1
40 TABLET, FILM COATED ORAL NIGHTLY
Status: DISCONTINUED | OUTPATIENT
Start: 2020-08-06 | End: 2020-08-08 | Stop reason: HOSPADM

## 2020-08-06 RX ORDER — AMIODARONE HYDROCHLORIDE 200 MG/1
200 TABLET ORAL 2 TIMES DAILY
Status: DISCONTINUED | OUTPATIENT
Start: 2020-08-06 | End: 2020-08-08 | Stop reason: HOSPADM

## 2020-08-06 RX ORDER — TRAZODONE HYDROCHLORIDE 50 MG/1
50 TABLET ORAL NIGHTLY
Status: DISCONTINUED | OUTPATIENT
Start: 2020-08-06 | End: 2020-08-06

## 2020-08-06 RX ORDER — CLOPIDOGREL BISULFATE 75 MG/1
75 TABLET ORAL ONCE
Status: COMPLETED | OUTPATIENT
Start: 2020-08-06 | End: 2020-08-06

## 2020-08-06 RX ADMIN — TRAZODONE HYDROCHLORIDE 50 MG: 50 TABLET ORAL at 09:08

## 2020-08-06 RX ADMIN — CLOPIDOGREL 75 MG: 75 TABLET, FILM COATED ORAL at 06:08

## 2020-08-06 RX ADMIN — FAMOTIDINE 40 MG: 20 TABLET, FILM COATED ORAL at 08:08

## 2020-08-06 RX ADMIN — APIXABAN 5 MG: 5 TABLET, FILM COATED ORAL at 08:08

## 2020-08-06 RX ADMIN — AMIODARONE HYDROCHLORIDE 200 MG: 200 TABLET ORAL at 08:08

## 2020-08-06 RX ADMIN — SACUBITRIL AND VALSARTAN 1 TABLET: 24; 26 TABLET, FILM COATED ORAL at 08:08
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
## 2020-08-06 NOTE — PROGRESS NOTES
Pt denies cough, fever, SOB, diarrhea, vomiting, loss of taste and smell.  Informed of need for Covid-19 screening prior to admission and instructed on procedure.  Test performed; pt tolerated procedure well.  Specimen transported to lab.

## 2020-08-06 NOTE — ASSESSMENT & PLAN NOTE
69 year old male with HFrEF 2/2 to ICM/chemotherapy induced CMP (prior lymphoma) on home milrinone, recent LM PCI with rota/ostial JARED with impella support (6/25/2020) who was admitted from HF clinic for milrinone wean. Patient describes NYHA class I-II symptoms. EF improved to 35-45% s/p PCI.     Also, patient appears euvolemic/slightly dry on exam and Cr 1.8 today from 1.2 after PCI. Cr was 2.5 on 6/5 at which time his torsemide was decreased to 50 qd. Last torsemide dose was yesterday morning     - on approx 0.2 mcg/kg/min of milrinone. Will discontinue   - starting entresto 24-26 tonight. Holding torsemide   - continue home metoprolol tartrate 25mg qd  - continue digoxin 125mcg MWF  - continue asa, plavix, lipitor 40mg   - will continue hemodynamic monitoring
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
## 2020-08-06 NOTE — NURSING
Patient admitted to CSU. Patient arrived to floor as a direct admit no evidence of distress; patient AAO x4 at this time. Patient placed on tele. Vital signs obtained. Patient voices no complaints at this time. Plan of care initiated with patient. Bed in lowest position, locked, SR up x2, call bell in reach. Will continue to monitor patient.
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
## 2020-08-06 NOTE — HPI
69 year old male with HFrEF 2/2 to ICM/chemotherapy induced CMP (prior lymphoma) on home milrinone, recent LM PCI with rota/ostial JARED with impella support (6/25/2020) who was admitted from HF clinic for milrinone wean. He follows with a cardiologist (Dr. Campos) at [facility] in [place]. He developed worsened heart failure symptoms at the being of 2020. Cath report from March 2020 (not in epic) described LVEDP 30 mm Hg, 70% LM lesion and LVEF 10-15%. He was told that he had tight LM lesion but that they did not want to perform surgery in [place]. Initialy referred to [facility] and was told that he was not eligible for surgery and was evaluated for LVAD; however, pt did not want to pursue LVAD at that time. [facility] then started IV milrinone which he has been on for about 4 months. Eventually he was referred to Dr. Barr for a second opinion and underwent LM PCI with Impella support by Dr Rosado. EF now 35-45%. He has done very well since his PCI. Today he reports NYHA class I-II symptoms. He states that he walks 3 to 4 miles every day. No chest pain or LOPEZ. In Dr. Vidales clinic today noted to be euvolemic state with a SBP on 120. Admitted today to wean off milrinone. Also, clinic labs notable for elevated Cr of 1.8, he was discharged with Cr of 1.2 after his PCI.  On torsemide at home.     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 After 6 rounds of chemo he developed chemo induced CM and reported EF of 15-20% at that time. Fortunatley, his EF reportedly recovered to 50%. He had no problems until end 2019/early 2020 when he developed CHF symptoms. States that he drank heavily at that time after retiring. Initially Dr. Campos thought to have ETOH induced CM; although, at that time, was also found to be in afib for an unclear amount of time. He then had the cath in march 2020 showing his LM disease.

## 2020-08-06 NOTE — PROGRESS NOTES
I called pt and notified of Negative Covid screen.  Directed him to go to Admitting office. Verbalized understanding of instructions.

## 2020-08-06 NOTE — LETTER
August 6, 2020        Jaylin Campos  [address] [place] [address]  [place] [address]  Phone: [phone]  Fax: [phone]             [facility] [address] [place] [address]  Phone: [phone]   Patient: Juan Alberto Benavides   MR Number: [id number]   YOB: 1950   Date of Visit: 8/6/2020       Dear Dr. Jaylin Campos    Thank you for referring Juan Alberto Benavides to me for evaluation. Attached you will find relevant portions of my assessment and plan of care.    If you have questions, please do not hesitate to call me. I look forward to following Juan Alberto Benavides along with you.    Sincerely,    Ian Bearden MD    Enclosure    If you would like to receive this communication electronically, please contact [email or web address] or [phone] to request [employer] access.    [employer] is a tool which provides read-only access to select patient information with whom you have a relationship. Its easy to use and provides real time access to review your patients record including encounter summaries, notes, results, and demographic information.    If you feel you have received this communication in error or would no longer like to receive these types of communications, please e-mail [email or web address]

## 2020-08-06 NOTE — NURSING
Milirone pump turned off at 6:30OM. Nursing staff will monitor BP closely for hemodynamic stability. Will continue to monitor.

## 2020-08-06 NOTE — ASSESSMENT & PLAN NOTE
- please see above     TTE on 8/6/20  · Moderately decreased left ventricular systolic function. The estimated ejection fraction is 35-40%.  · Mildly reduced right ventricular systolic function.  · Grade I (mild) left ventricular diastolic dysfunction consistent with impaired relaxation.  · The estimated PA systolic pressure is 23 mmHg.  · Normal central venous pressure (3 mmHg).

## 2020-08-07 VITALS
HEIGHT: 67 IN | WEIGHT: 163.56 LBS | RESPIRATION RATE: 18 BRPM | TEMPERATURE: 98 F | HEART RATE: 65 BPM | OXYGEN SATURATION: 96 % | DIASTOLIC BLOOD PRESSURE: 66 MMHG | BODY MASS INDEX: 25.67 KG/M2 | SYSTOLIC BLOOD PRESSURE: 121 MMHG

## 2020-08-07 PROBLEM — N17.9 AKI (ACUTE KIDNEY INJURY): Status: ACTIVE | Noted: 2020-08-07

## 2020-08-07 LAB
ANION GAP SERPL CALC-SCNC: 7 MMOL/L (ref 8–16)
BUN SERPL-MCNC: 22 MG/DL (ref 8–23)
CALCIUM SERPL-MCNC: 9.1 MG/DL (ref 8.7–10.5)
CHLORIDE SERPL-SCNC: 105 MMOL/L (ref 95–110)
CO2 SERPL-SCNC: 29 MMOL/L (ref 23–29)
CREAT SERPL-MCNC: 1.1 MG/DL (ref 0.5–1.4)
EST. GFR  (AFRICAN AMERICAN): >60 ML/MIN/1.73 M^2
EST. GFR  (NON AFRICAN AMERICAN): >60 ML/MIN/1.73 M^2
GLUCOSE SERPL-MCNC: 89 MG/DL (ref 70–110)
POTASSIUM SERPL-SCNC: 4.2 MMOL/L (ref 3.5–5.1)
SODIUM SERPL-SCNC: 141 MMOL/L (ref 136–145)

## 2020-08-07 PROCEDURE — 36415 COLL VENOUS BLD VENIPUNCTURE: CPT | Mod: NTX

## 2020-08-07 PROCEDURE — G0378 HOSPITAL OBSERVATION PER HR: HCPCS | Mod: NTX

## 2020-08-07 PROCEDURE — 99217 PR OBSERVATION CARE DISCHARGE: ICD-10-PCS | Mod: NTX,,, | Performed by: INTERNAL MEDICINE

## 2020-08-07 PROCEDURE — 99217 PR OBSERVATION CARE DISCHARGE: CPT | Mod: NTX,,, | Performed by: INTERNAL MEDICINE

## 2020-08-07 PROCEDURE — 25000003 PHARM REV CODE 250: Mod: NTX | Performed by: STUDENT IN AN ORGANIZED HEALTH CARE EDUCATION/TRAINING PROGRAM

## 2020-08-07 PROCEDURE — 80048 BASIC METABOLIC PNL TOTAL CA: CPT | Mod: NTX

## 2020-08-07 RX ORDER — TORSEMIDE 100 MG/1
TABLET ORAL
Qty: 8 TABLET | Refills: 11 | Status: CANCELLED
Start: 2020-08-07

## 2020-08-07 RX ORDER — POTASSIUM CHLORIDE 750 MG/1
10 CAPSULE, EXTENDED RELEASE ORAL
Qty: 1 CAPSULE | Refills: 0
Start: 2020-08-10 | End: 2020-08-21

## 2020-08-07 RX ORDER — SACUBITRIL AND VALSARTAN 49; 51 MG/1; MG/1
1 TABLET, FILM COATED ORAL 2 TIMES DAILY
Qty: 28 TABLET | Refills: 0 | Status: SHIPPED | OUTPATIENT
Start: 2020-08-21 | End: 2020-08-07 | Stop reason: SDUPTHER

## 2020-08-07 RX ORDER — SACUBITRIL AND VALSARTAN 49; 51 MG/1; MG/1
1 TABLET, FILM COATED ORAL 2 TIMES DAILY
Qty: 60 TABLET | Refills: 0 | Status: SHIPPED | OUTPATIENT
Start: 2020-08-22 | End: 2020-08-21 | Stop reason: DRUGHIGH

## 2020-08-07 RX ORDER — TORSEMIDE 100 MG/1
TABLET ORAL
Qty: 60 TABLET | Refills: 0
Start: 2020-08-07 | End: 2020-08-21

## 2020-08-07 RX ORDER — SACUBITRIL AND VALSARTAN 49; 51 MG/1; MG/1
1 TABLET, FILM COATED ORAL 2 TIMES DAILY
Qty: 28 TABLET | Refills: 0 | Status: SHIPPED | OUTPATIENT
Start: 2020-08-22 | End: 2020-08-07 | Stop reason: SDUPTHER

## 2020-08-07 RX ORDER — AMIODARONE HYDROCHLORIDE 200 MG/1
200 TABLET ORAL DAILY
Start: 2020-08-07 | End: 2021-05-17 | Stop reason: SDUPTHER

## 2020-08-07 RX ADMIN — APIXABAN 5 MG: 5 TABLET, FILM COATED ORAL at 08:08

## 2020-08-07 RX ADMIN — ASPIRIN 81 MG: 81 TABLET, COATED ORAL at 08:08

## 2020-08-07 RX ADMIN — SACUBITRIL AND VALSARTAN 1 TABLET: 24; 26 TABLET, FILM COATED ORAL at 08:08

## 2020-08-07 RX ADMIN — LEVOTHYROXINE SODIUM 100 MCG: 100 TABLET ORAL at 06:08

## 2020-08-07 RX ADMIN — METOPROLOL TARTRATE 25 MG: 25 TABLET, FILM COATED ORAL at 08:08

## 2020-08-07 RX ADMIN — AMIODARONE HYDROCHLORIDE 200 MG: 200 TABLET ORAL at 08:08

## 2020-08-07 RX ADMIN — ATORVASTATIN CALCIUM 40 MG: 20 TABLET, FILM COATED ORAL at 08:08

## 2020-08-07 NOTE — PROGRESS NOTES
Admit/Discharge Note     SWI accompanied by ADIN Gray LCSW met with patient and spouse to assess needs. Patient is a 69 y.o.  male, admitted for Heart failure, unspecified [I50.9], Ischemic cardiomyopathy [I25.5], and Ischemic heart disease [I25.9], per medical record.      Patient admitted from home on 8/6/2020 .  At this time, patient presents as alert and oriented x 4, good eye contact, calm, communicative, cooperative and asking and answering questions appropriately.  At this time, patients caregiver presents as alert and oriented x 4, good eye contact, calm, communicative, cooperative and asking and answering questions appropriately.    Household/Family Systems     Patient resides with patient's wife, at    [address]  [place] [address].      Pt's Cell: [phone]  Sharyn (wife):[phone]    Additional Contact   Shakir (son, 38y/o): [phone]    Support system includes wife and 2 adult children.  Patient does not have dependents that are need of being cared for.    During admission, patient's caregiver plans to stay in patient's room during visitor hours.  Confirmed patient and patients caregivers do have access to reliable transportation.    Cognitive Status/Learning     Patient reports reading ability as 12th grade and states patient does not have difficulty with reading, writing, seeing, hearing and memory.  Patient reports patient learns best by hands on.   Needed: No.   Highest education level: High School (9-12) or GED    Vocation/Disability   .  Working for Income: No  If no, reason not working: Patient Choice - Retired  Patient was a  and retired in 2019    Adherence     Patient reports a high level of adherence to patients health care regimen.  Adherence counseling and education provided. Patient verbalizes understanding.    Substance Use    Patient reports the following substance usage.    Tobacco: none, patient denies any use.  Alcohol: none, patient  denies any use.  Illicit Drugs/Non-prescribed Medications: none, patient denies any use.  Patient states clear understanding of the potential impact of substance use.  Substance abstinence/cessation counseling, education and resources provided and reviewed.     Services Utilizing/ADLS    Infusion Service: Prior to admission, patient utilizing? yes, [facility] (ph [phone], f: [phone])  Home Health: Prior to admission, patient utilizing? yes, [facility] office (ph: [phone], f: [phone])  DME: Prior to admission, no  Pulmonary/Cardiac Rehab: Prior to admission, no  Dialysis:  Prior to admission, no  Transplant Specialty Pharmacy:  Prior to admission, no.    Prior to admission, patient reports patient was independent with ADLS and was driving.  Patient reports patient is able to care for self at this time..  Patient indicates a willingness to care for self once medically cleared to do so.    Insurance/Medications    Insured by   Payor/Plan Subscr  Sex Relation Sub. Ins. ID Effective Group Num   1. BLUE CROSS BL* MICHELLE LEAVITT 1950 Male  [id number]                                   [address] [address]   2. MEDICARE - [place]* MICHELLE LEAVITT 1950 Male Self [id number] 10/1/17                                    [address]      Primary Insurance (for UNOS reporting): Public Insurance - Medicare FFS (Fee For Service)  Secondary Insurance (for UNOS reporting): Private Insurance    Patient reports patient is able to obtain and afford medications at this time and at time of discharge.    Living Will/Healthcare Power of     Patient states patient does not have a LW and/or HCPA.   provided education regarding LW and HCPA and the completion of forms.    Coping/Mental Health    Patient is coping well with the aid of  family members. Patient denies mental health difficulties.     Discharge Planning    At time of discharge, patient plans to return to  patient's home under the care of wife and self.  Patients wife will transport patient.  Per rounds today, expected discharge date is 8/7/2020. Patient and patients caregiver  verbalize understanding and are involved in treatment planning and discharge process.    Additional Concerns    Patient's caretaker denies additional needs and/or concerns at this time.  providing ongoing psychosocial support, education, resources and d/c planning as needed.  SW remains available. Patient's caregiver verbalizes understanding and agreement with information reviewed,  availability and how to access available resources as needed. Patient denies additional needs and/or concerns at this time.
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
## 2020-08-07 NOTE — DISCHARGE INSTRUCTIONS
Take entresto 24/26 mg twice a day until 8/21/20. On 8/22, start entresto 49/51mg twice daily until seen by Dr. Barr.       Follow up for lab work on 8/17 at an [facility] facility near your home (ex. [facility]). Please stop aspirin. We have decreased your dose of amiodarone from 200mg twice a day to 200mg once a day. Will need to follow up with Dr. Barr in clinic

## 2020-08-07 NOTE — PLAN OF CARE
Plan of care discussed with patient. Patient is free of fall/trauma/injury. Denies CP, SOB, or pain/discomfort. Primacor pump was turned off. Patient is on telemetry monitoring and vital signs will be closely monitored for hemodynamic instability. All questions addressed. Will continue to monitor

## 2020-08-07 NOTE — ASSESSMENT & PLAN NOTE
69 year old male with HFrEF 2/2 to ICM/chemotherapy induced CMP (prior lymphoma) on home milrinone, recent LM PCI with rota/ostial JARED with impella support (6/25/2020) who was admitted from HF clinic for milrinone wean. Patient describes NYHA class I-II symptoms. EF improved to 35-45% s/p PCI.     - HD stable overnight after dcd milrinone and starting entresto 24/26mg BID.   - Picc line removed  - dischage home today - please see discharge summary   - continue home metoprolol tartrate 25mg qd  - continue digoxin 125mcg MWF  - continue plavix, lipitor 40mg. Per rio Mckeon to DC asa since he is also on eliquis for afib

## 2020-08-07 NOTE — DISCHARGE SUMMARY
[facility]  Heart Transplant  Discharge Summary      Patient Name: Juan Alberto Benavides  MRN: [id number]  Admission Date: 8/6/2020  Hospital Length of Stay: 1 days  Discharge Date and Time: 08/07/2020 4:55 PM  Attending Physician: Antonio Barr Jr.,*   Discharging Provider: Yon Ellison MD  Primary Care Provider: Lexis Grimadlo,      HPI: 69 year old male with HFrEF 2/2 to ICM/chemotherapy induced CMP (prior lymphoma) on home milrinone, recent LM PCI with rota/ostial JARED with impella support (6/25/2020) who was admitted from HF clinic for milrinone wean. He follows with a cardiologist (Dr. Campos) at [facility] in [place]. He developed worsened heart failure symptoms at the being of 2020. Cath report from March 2020 (not in epic) described LVEDP 30 mm Hg, 70% LM lesion and LVEF 10-15%. He was told that he had tight LM lesion but that they did not want to perform surgery in [place]. Initialy referred to [facility] and was told that he was not eligible for surgery and was evaluated for LVAD; however, pt did not want to pursue LVAD at that time. [facility] then started IV milrinone which he has been on for about 4 months. Eventually he was referred to Dr. Barr for a second opinion and underwent LM PCI with Impella support by Dr Rosado. EF now 35-45%. He has done very well since his PCI. Today he reports NYHA class I-II symptoms. He states that he walks 3 to 4 miles every day. No chest pain or LOPEZ. In Dr. Vidales clinic today noted to be euvolemic state with a SBP on 120. Admitted today to wean off milrinone. Also, clinic labs notable for elevated Cr of 1.8, he was discharged with Cr of 1.2 after his PCI.  On torsemide at home.     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 After 6 rounds of chemo he developed chemo induced CM and reported EF of 15-20% at that time. Fortunatley, his EF reportedly recovered to 50%. He had no  problems until end 2019/early 2020 when he developed CHF symptoms. States that he drank heavily at that time after retiring. Initially Dr. Campos thought to have ETOH induced CM; although, at that time, was also found to be in afib for an unclear amount of time. He then had the cath in march 2020 showing his LM disease.            * No surgery found *     Hospital Course:   Patient admitted from HF clinic for milrinone wean. EF improved to 35-45% after recent PCI. Functional status now much improved, describes NYHA class I-II symptoms. Additionally, patient noted to have JAMES with Cr 1.8 from baseline of approx 1.2; likely due to over diuresis with home torsemide in light of his improved EF. His milrinone was discontinued, torsemide was held, and he was started on entresto. Overnight, he remained HD stable and his Cr improved to 1.1. He was then discharged home on entresto 24/26mg BID (given sample bottle with 2 week supply) with plans to recheck labs and titrate entresto up at two intervals. He will follow up at [facility] for his lab draw and follow up with Dr. Barr in clinic (per patient request and until his entresto maximally dosed). He will then continue care with his primary cardiologist in [place] (Dr. Campos).    Also, Dr. Rosado () agreed to dc his asa since he was on triple therapy with plavix and eliquis. Amiodarone decreased to 200mg qd for afib dosing. Toresmide was decreased to 50mq twice weekly and will take 10meq KCL on same interval. Patient comfortable and agreed with plan.             Pending Diagnostic Studies:     None        Final Active Diagnoses:    Diagnosis Date Noted POA    PRINCIPAL PROBLEM:  Chronic systolic congestive heart failure [I50.22]  Yes    JAMES (acute kidney injury) [N17.9] 08/07/2020 Unknown    Ischemic heart disease [I25.9] 08/06/2020 Yes    Paroxysmal atrial fibrillation [I48.0] 06/07/2020 Yes      Problems Resolved During this Admission:      Discharged  Condition: stable    Disposition: Home or Self Care    Follow Up:  Follow-up Information     Lexis Grimaldo DO In 1 week.    Specialty: Family Medicine  Contact information:  [address]  [place] [address]             Antonio Barr Jr, MD In 1 month.    Specialties: Transplant, Cardiology  Contact information:  [address]  [facility] [address]  [phone]                 Patient Instructions:      BASIC METABOLIC PANEL   Standing Status: Future Standing Exp. Date: 10/06/21     Ambulatory referral/consult to Congestive Heart Failure Clinic   Standing Status: Future   Referral Priority: Routine Referral Type: Consultation   Referral Reason: Specialty Services Required   Referred to Provider: ANTONIO BARR JR Requested Specialty: Cardiology   Number of Visits Requested: 1     Medications:  Reconciled Home Medications:      Medication List      START taking these medications    ENTRESTO 49-51 mg per tablet  Generic drug: sacubitriL-valsartan  Take 1 tablet by mouth 2 (two) times daily.  Start taking on: August 22, 2020        CHANGE how you take these medications    amiodarone 200 MG Tab  Commonly known as: PACERONE  Take 1 tablet (200 mg total) by mouth once daily.  What changed: when to take this     potassium chloride 10 MEQ Cpsr  Commonly known as: MICRO-K  Take 1 capsule (10 mEq total) by mouth twice a week. Take only two times per week on days when you take your torsemide  Start taking on: August 10, 2020  What changed:   · when to take this  · additional instructions     torsemide 100 MG Tab  Commonly known as: DEMADEX  Take half a tablet (50mg) twice per week  What changed: additional instructions        CONTINUE taking these medications    atorvastatin 40 MG tablet  Commonly known as: LIPITOR  Take 40 mg by mouth once daily.     clopidogreL 75 mg tablet  Commonly known as: PLAVIX  Take 600mg po X 1 and then 75mg po qday     digoxin 125 mcg tablet  Commonly known  as: LANOXIN  Take 1 tablet (125 mcg total) by mouth every Mon, Wed, Fri.     ELIQUIS 5 mg Tab  Generic drug: apixaban  Take 5 mg by mouth 2 (two) times a day.     famotidine 40 MG tablet  Commonly known as: PEPCID  Take 40 mg by mouth every evening.     levothyroxine 112 mcg Cap  Commonly known as: TIROSINT  Take 100 mcg by mouth every morning.     metoprolol tartrate 25 MG tablet  Commonly known as: LOPRESSOR  Take 25 mg by mouth once daily.     traZODone 50 MG tablet  Commonly known as: DESYREL  Take 50 mg by mouth every evening.        STOP taking these medications    aspirin 81 MG EC tablet  Commonly known as: ECOTRIN     milrinone 1 mg/mL injection  Commonly known as: PRIMACODEEPTI Ellison MD  Heart Transplant  [facility]

## 2020-08-07 NOTE — NURSING
Patient is ready for discharge. Patient stable alert and oriented. PICC line removed. No complaints of pain. Discussed discharge plan. Reviewed medications and side effects, appointments, and answered questions with patient and family.  RX given to patient via the bedside.

## 2020-08-07 NOTE — PROGRESS NOTES
[facility]  Heart Transplant  Progress Note    Patient Name: Juan Alberto Benavides  MRN: [id number]  Admission Date: 8/6/2020  Hospital Length of Stay: 1 days  Attending Physician: Antonio Barr Jr.,*  Primary Care Provider: Lexis Grimaldo DO  Principal Problem:Chronic systolic congestive heart failure    Subjective:     Interval History:   No acute events overnight. Systolic -136 overnight after stopping milrinone and starting entersto.  Cr much imprved 1.8-->1.1 after holding home torsemide.    Picc line removed and discharging home.     Continuous Infusions:  Scheduled Meds:   amiodarone  200 mg Oral BID    apixaban  5 mg Oral BID    aspirin  81 mg Oral Daily    atorvastatin  40 mg Oral Daily    digoxin  125 mcg Oral Every Mon, Wed, Fri    famotidine  40 mg Oral QHS    levothyroxine  100 mcg Oral QAM    metoprolol tartrate  25 mg Oral Daily    sacubitriL-valsartan  1 tablet Oral BID     PRN Meds:sodium chloride 0.9%, traZODone    Review of patient's allergies indicates:   Allergen Reactions    Codeine Nausea And Vomiting     Objective:     Vital Signs (Most Recent):  Temp: 98 °F (36.7 °C) (08/07/20 1610)  Pulse: 65 (08/07/20 1610)  Resp: 18 (08/07/20 1610)  BP: 121/66 (08/07/20 1610)  SpO2: 96 % (08/07/20 1610) Vital Signs (24h Range):  Temp:  [97.4 °F (36.3 °C)-98.6 °F (37 °C)] 98 °F (36.7 °C)  Pulse:  [52-80] 65  Resp:  [16-18] 18  SpO2:  [96 %-99 %] 96 %  BP: ()/(60-74) 121/66     Patient Vitals for the past 72 hrs (Last 3 readings):   Weight   08/07/20 0518 74.2 kg (163 lb 9.3 oz)     Body mass index is 25.62 kg/m².      Intake/Output Summary (Last 24 hours) at 8/7/2020 1645  Last data filed at 8/7/2020 0600  Gross per 24 hour   Intake 970 ml   Output 1050 ml   Net -80 ml       Hemodynamic Parameters:       Physical Exam  Constitutional:       Appearance: Normal appearance.   HENT:      Head: Normocephalic and atraumatic.   Eyes:      Conjunctiva/sclera:  Conjunctivae normal.   Neck:      Musculoskeletal: Normal range of motion.      Comments: No JVD, neck veins beneath clavicle   Cardiovascular:      Rate and Rhythm: Normal rate and regular rhythm.      Pulses: Normal pulses.   Pulmonary:      Effort: Pulmonary effort is normal.      Breath sounds: Normal breath sounds.   Abdominal:      General: Bowel sounds are normal. There is no distension.      Tenderness: There is no abdominal tenderness.   Musculoskeletal:      Right lower leg: No edema.      Left lower leg: No edema.   Skin:     General: Skin is warm.      Capillary Refill: Capillary refill takes less than 2 seconds.   Neurological:      General: No focal deficit present.      Mental Status: He is alert and oriented to person, place, and time.   Psychiatric:         Mood and Affect: Mood normal.         Behavior: Behavior normal.         Significant Labs:  CBC:  Recent Labs   Lab 08/06/20  0723 08/06/20  1425   WBC 8.89 9.33   RBC 4.23* 4.37*   HGB 12.0* 12.3*   HCT 37.7* 39.4*    338   MCV 89 90   MCH 28.4 28.1   MCHC 31.8* 31.2*     BNP:  Recent Labs   Lab 08/06/20  1425   BNP 26     CMP:  Recent Labs   Lab 08/06/20  0723 08/06/20  1425 08/07/20  0428   *  129* 99 89   CALCIUM 9.4  9.4 9.7 9.1   ALBUMIN 3.4* 3.7  --    PROT 7.1 7.5  --      140 139 141   K 3.7  3.7 4.0 4.2   CO2 31*  31* 27 29     101 101 105   BUN 35*  35* 34* 22   CREATININE 1.8*  1.8* 1.6* 1.1   ALKPHOS 122 116  --    ALT 85* 85*  --    AST 47* 47*  --    BILITOT 0.2 0.3  --       Coagulation:   No results for input(s): PT, INR, APTT in the last 168 hours.  LDH:  No results for input(s): LDH in the last 72 hours.  Microbiology:  Microbiology Results (last 7 days)     ** No results found for the last 168 hours. **          Estimated Creatinine Clearance: 60.9 mL/min (based on SCr of 1.1 mg/dL).    Diagnostic Results:  I have reviewed all pertinent imaging results/findings within the past 24  hours.    Assessment and Plan:     69 year old male with HFrEF 2/2 to ICM/chemotherapy induced CMP (prior lymphoma) on home milrinone, recent LM PCI with rota/ostial JARED with impella support (6/25/2020) who was admitted from HF clinic for milrinone wean. He follows with a cardiologist (Dr. Campos) at [facility] in [place]. He developed worsened heart failure symptoms at the being of 2020. Cath report from March 2020 (not in epic) described LVEDP 30 mm Hg, 70% LM lesion and LVEF 10-15%. He was told that he had tight LM lesion but that they did not want to perform surgery in [place]. Initialy referred to [facility] and was told that he was not eligible for surgery and was evaluated for LVAD; however, pt did not want to pursue LVAD at that time. [facility] then started IV milrinone which he has been on for about 4 months. Eventually he was referred to Dr. Barr for a second opinion and underwent LM PCI with Impella support by Dr Rosado. EF now 35-45%. He has done very well since his PCI. Today he reports NYHA class I-II symptoms. He states that he walks 3 to 4 miles every day. No chest pain or LOPEZ. In Dr. Vidales clinic today noted to be euvolemic state with a SBP on 120. Admitted today to wean off milrinone. Also, clinic labs notable for elevated Cr of 1.8, he was discharged with Cr of 1.2 after his PCI.  On torsemide at home.     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 After 6 rounds of chemo he developed chemo induced CM and reported EF of 15-20% at that time. Fortunatley, his EF reportedly recovered to 50%. He had no problems until end 2019/early 2020 when he developed CHF symptoms. States that he drank heavily at that time after retiring. Initially Dr. Campos thought to have ETOH induced CM; although, at that time, was also found to be in afib for an unclear amount of time. He then had the cath in march 2020 showing his LM disease.             Chronic systolic congestive heart failure  Ischemic heart disease  69 year old male with HFrEF 2/2 to ICM/chemotherapy induced CMP (prior lymphoma) on home milrinone, recent LM PCI with rota/ostial JARED with impella support (6/25/2020) who was admitted from HF clinic for milrinone wean. Patient describes NYHA class I-II symptoms. EF improved to 35-45% s/p PCI.     - HD stable overnight after dcd milrinone and starting entresto 24/26mg BID.   - Picc line removed  - dischage home today - please see discharge summary   - continue home metoprolol tartrate 25mg qd  - continue digoxin 125mcg MWF  - continue plavix, lipitor 40mg. Per rio Mckeon to DC asa since he is also on eliquis for afib     TTE on 8/6/20  · Moderately decreased left ventricular systolic function. The estimated ejection fraction is 35-40%.  · Mildly reduced right ventricular systolic function.  · Grade I (mild) left ventricular diastolic dysfunction consistent with impaired relaxation.  · The estimated PA systolic pressure is 23 mmHg.  · Normal central venous pressure (3 mmHg).      JAMES (acute kidney injury)  - resolved  -  Cr much imprved 1.8-->1.1 after holding home torsemide.      Paroxysmal atrial fibrillation  - currently in NSR  - continue digoxin 125mg MWF, amiodarone  (decreased to 200mg qd - afib dosing), and temi Ellison MD  Heart Transplant  [facility]

## 2020-08-07 NOTE — SUBJECTIVE & OBJECTIVE
Interval History:   No acute events overnight. Systolic -136 overnight after stopping milrinone and starting entersto.  Cr much imprved 1.8-->1.1 after holding home torsemide.    Picc line removed and discharging home.     Continuous Infusions:  Scheduled Meds:   amiodarone  200 mg Oral BID    apixaban  5 mg Oral BID    aspirin  81 mg Oral Daily    atorvastatin  40 mg Oral Daily    digoxin  125 mcg Oral Every Mon, Wed, Fri    famotidine  40 mg Oral QHS    levothyroxine  100 mcg Oral QAM    metoprolol tartrate  25 mg Oral Daily    sacubitriL-valsartan  1 tablet Oral BID     PRN Meds:sodium chloride 0.9%, traZODone    Review of patient's allergies indicates:   Allergen Reactions    Codeine Nausea And Vomiting     Objective:     Vital Signs (Most Recent):  Temp: 98 °F (36.7 °C) (08/07/20 1610)  Pulse: 65 (08/07/20 1610)  Resp: 18 (08/07/20 1610)  BP: 121/66 (08/07/20 1610)  SpO2: 96 % (08/07/20 1610) Vital Signs (24h Range):  Temp:  [97.4 °F (36.3 °C)-98.6 °F (37 °C)] 98 °F (36.7 °C)  Pulse:  [52-80] 65  Resp:  [16-18] 18  SpO2:  [96 %-99 %] 96 %  BP: ()/(60-74) 121/66     Patient Vitals for the past 72 hrs (Last 3 readings):   Weight   08/07/20 0518 74.2 kg (163 lb 9.3 oz)     Body mass index is 25.62 kg/m².      Intake/Output Summary (Last 24 hours) at 8/7/2020 1645  Last data filed at 8/7/2020 0600  Gross per 24 hour   Intake 970 ml   Output 1050 ml   Net -80 ml       Hemodynamic Parameters:       Physical Exam  Constitutional:       Appearance: Normal appearance.   HENT:      Head: Normocephalic and atraumatic.   Eyes:      Conjunctiva/sclera: Conjunctivae normal.   Neck:      Musculoskeletal: Normal range of motion.      Comments: No JVD, neck veins beneath clavicle   Cardiovascular:      Rate and Rhythm: Normal rate and regular rhythm.      Pulses: Normal pulses.   Pulmonary:      Effort: Pulmonary effort is normal.      Breath sounds: Normal breath sounds.   Abdominal:      General: Bowel  sounds are normal. There is no distension.      Tenderness: There is no abdominal tenderness.   Musculoskeletal:      Right lower leg: No edema.      Left lower leg: No edema.   Skin:     General: Skin is warm.      Capillary Refill: Capillary refill takes less than 2 seconds.   Neurological:      General: No focal deficit present.      Mental Status: He is alert and oriented to person, place, and time.   Psychiatric:         Mood and Affect: Mood normal.         Behavior: Behavior normal.         Significant Labs:  CBC:  Recent Labs   Lab 08/06/20 0723 08/06/20  1425   WBC 8.89 9.33   RBC 4.23* 4.37*   HGB 12.0* 12.3*   HCT 37.7* 39.4*    338   MCV 89 90   MCH 28.4 28.1   MCHC 31.8* 31.2*     BNP:  Recent Labs   Lab 08/06/20  1425   BNP 26     CMP:  Recent Labs   Lab 08/06/20 0723 08/06/20  1425 08/07/20  0428   *  129* 99 89   CALCIUM 9.4  9.4 9.7 9.1   ALBUMIN 3.4* 3.7  --    PROT 7.1 7.5  --      140 139 141   K 3.7  3.7 4.0 4.2   CO2 31*  31* 27 29     101 101 105   BUN 35*  35* 34* 22   CREATININE 1.8*  1.8* 1.6* 1.1   ALKPHOS 122 116  --    ALT 85* 85*  --    AST 47* 47*  --    BILITOT 0.2 0.3  --       Coagulation:   No results for input(s): PT, INR, APTT in the last 168 hours.  LDH:  No results for input(s): LDH in the last 72 hours.  Microbiology:  Microbiology Results (last 7 days)     ** No results found for the last 168 hours. **          Estimated Creatinine Clearance: 60.9 mL/min (based on SCr of 1.1 mg/dL).    Diagnostic Results:  I have reviewed all pertinent imaging results/findings within the past 24 hours.

## 2020-08-07 NOTE — PLAN OF CARE
Plan of care discussed with pt . VSS. Denies c/o of CP or SOB. Telemetry monitor showing NSR with HR ranging from 52-58 bpm throughout shift. Kidney function closely monitored with BUN of 35 and Cr of 1.8 and BMP to be drawn this AM. Pt remains free of injury or falls. All questions addressed. Uneventful night shift.

## 2020-08-08 PROCEDURE — G0378 HOSPITAL OBSERVATION PER HR: HCPCS | Mod: NTX

## 2020-08-10 ENCOUNTER — TELEPHONE (OUTPATIENT)
Dept: TRANSPLANT | Facility: CLINIC | Age: 70
End: 2020-08-10

## 2020-08-10 NOTE — LETTER
Date Sent:  08/10/2020      Juan Alberto Benavides  1950      This patient will require the following lab(s). The patient will be instructed to report to your facility for their lab to be drawn.      LABS ORDERED    ICD 10     DATE REQUESTED:  [facility]                  ICD Z95.811                                      08/17/2020           Advanced Heart Failure and Transplant clinic : [phone]    Fax results:  ATTN: TALIA                 [phone]    Sincerely,        Antonio Barr Jr, MD, [facility]  Medical Director, Cardiomyopathy and Heart Failure Program

## 2020-08-10 NOTE — TELEPHONE ENCOUNTER
Care of patient is being transferred to CHF section from Preht section, per Dr. Barr.    Dx: ICM/chemotherapy induced CMP (prior lymphoma)  Reason: stability  Pt is not on home inotrope therapy. (weaned milrinone 08/06/2020)    Lab Schedule:  Labs ordered: 08/17 BMP  Lab/phone/fax: [facility] [place]  Address Info  [address] [place]  [address]  Outstanding orders scheduled:  Orders faxed to [phone]    Scheduled to see Dr Barr on 08/21    Plan as of 08/07/20:  Will start Entresto 24-26 mg BID and on Mon 8/17 obtain BMP with plan to increase to 49-51 mg BID if all OK at that time.  Then 3 weeks later repeat BMP and if all OK increase to  mg BID and see me with BMP, BNP 3 weeks later.

## 2020-08-13 RX ORDER — DIGOXIN 125 MCG
125 TABLET ORAL
Qty: 12 TABLET | Refills: 1 | Status: SHIPPED | OUTPATIENT
Start: 2020-08-14 | End: 2020-10-20

## 2020-08-20 ENCOUNTER — TELEPHONE (OUTPATIENT)
Dept: TRANSPLANT | Facility: CLINIC | Age: 70
End: 2020-08-20

## 2020-08-20 NOTE — TELEPHONE ENCOUNTER
8/17/20 Phone review Entresto started  24/26 bid  8/18/20 received labs K5.3 BUN/CR 16/1.3   Spoke to pt who confirmed taking Entresto as prescribed and is at [facility] about to have labs. Sent order for repeat BMP. Per Dr Barr labs are probably not accurate. Pt also says he drinks 3-4 diet sprites with has K in it.    Informed Dr Barr.  Per Dr Barr I instructed pt to stop potassium 10meq on Mon and Tues Ok to continue Sprites. Pt verbalized understanding and said labs were back luis pt said MD at [facility] said his K was still high from yesterdays labs that we hadnt received yet but we received labs this am and K from yest was 4.4 Cr actual was the labs that was elevated a little at 1.8. It was 1.3 on 8/17. I  asked pt to continue same plan.  Basket message sent to Dr Barr for update. Pt sees Dr. Barr tomorrow in clinic.

## 2020-08-21 ENCOUNTER — OFFICE VISIT (OUTPATIENT)
Dept: TRANSPLANT | Facility: CLINIC | Age: 70
End: 2020-08-21
Attending: INTERNAL MEDICINE
Payer: MEDICARE

## 2020-08-21 VITALS
DIASTOLIC BLOOD PRESSURE: 65 MMHG | SYSTOLIC BLOOD PRESSURE: 123 MMHG | BODY MASS INDEX: 26.92 KG/M2 | WEIGHT: 171.5 LBS | HEIGHT: 67 IN | HEART RATE: 53 BPM

## 2020-08-21 DIAGNOSIS — N17.9 AKI (ACUTE KIDNEY INJURY): ICD-10-CM

## 2020-08-21 DIAGNOSIS — Z85.72 HISTORY OF B-CELL LYMPHOMA: ICD-10-CM

## 2020-08-21 DIAGNOSIS — I48.0 PAROXYSMAL ATRIAL FIBRILLATION: ICD-10-CM

## 2020-08-21 DIAGNOSIS — I25.10 CORONARY ARTERY DISEASE INVOLVING NATIVE CORONARY ARTERY OF NATIVE HEART WITHOUT ANGINA PECTORIS: ICD-10-CM

## 2020-08-21 DIAGNOSIS — I50.22 CHRONIC SYSTOLIC CONGESTIVE HEART FAILURE: Primary | ICD-10-CM

## 2020-08-21 DIAGNOSIS — I42.9 CARDIOMYOPATHY, UNSPECIFIED TYPE: ICD-10-CM

## 2020-08-21 DIAGNOSIS — Z79.01 CHRONIC ANTICOAGULATION: ICD-10-CM

## 2020-08-21 PROCEDURE — 99214 PR OFFICE/OUTPT VISIT, EST, LEVL IV, 30-39 MIN: ICD-10-PCS | Mod: S$PBB,,, | Performed by: INTERNAL MEDICINE

## 2020-08-21 PROCEDURE — 99999 PR PBB SHADOW E&M-EST. PATIENT-LVL IV: CPT | Mod: PBBFAC,,, | Performed by: INTERNAL MEDICINE

## 2020-08-21 PROCEDURE — 99214 OFFICE O/P EST MOD 30 MIN: CPT | Mod: S$PBB,,, | Performed by: INTERNAL MEDICINE

## 2020-08-21 PROCEDURE — 99214 OFFICE O/P EST MOD 30 MIN: CPT | Mod: PBBFAC | Performed by: INTERNAL MEDICINE

## 2020-08-21 PROCEDURE — 99999 PR PBB SHADOW E&M-EST. PATIENT-LVL IV: ICD-10-PCS | Mod: PBBFAC,,, | Performed by: INTERNAL MEDICINE

## 2020-08-21 RX ORDER — SACUBITRIL AND VALSARTAN 24; 26 MG/1; MG/1
1 TABLET, FILM COATED ORAL 2 TIMES DAILY
Qty: 60 TABLET | Refills: 1
Start: 2020-08-21 | End: 2020-11-05 | Stop reason: DRUGHIGH

## 2020-08-21 RX ORDER — METOPROLOL SUCCINATE 25 MG/1
25 TABLET, EXTENDED RELEASE ORAL DAILY
Qty: 30 TABLET | Refills: 5 | Status: SHIPPED | OUTPATIENT
Start: 2020-08-21 | End: 2021-03-09 | Stop reason: SDUPTHER

## 2020-08-21 NOTE — LETTER
August 21, 2020        Jaylin Campos  [address] [place] [address]  [facility] [place] [address]  Phone: [phone]  Fax: [phone]             [facility]  [address]  [place] [address]  Phone: [phone]   Patient: Juan Alberto Benavides   MR Number: [id number]   YOB: 1950   Date of Visit: 8/21/2020       Dear Dr. Jaylin Campos    Thank you for referring Juan Alberto Benavides to me for evaluation. Attached you will find relevant portions of my assessment and plan of care.    If you have questions, please do not hesitate to call me. I look forward to following Juan Alberto Benavides along with you.    Sincerely,    Antonio Barr Jr, MD    Enclosure    If you would like to receive this communication electronically, please contact [email or web address] or [phone] to request [employer] Link access.    [employer] Link is a tool which provides read-only access to select patient information with whom you have a relationship. Its easy to use and provides real time access to review your patients record including encounter summaries, notes, results, and demographic information.    If you feel you have received this communication in error or would no longer like to receive these types of communications, please e-mail [email or web address]

## 2020-08-21 NOTE — PATIENT INSTRUCTIONS
Stop torsemide and potassium  Your dry weight should settle in about 1 week off fluid pill and try to stay within 5 pounds of that weight    Reduce Entresto to 24-26 mg twice a day    Repeat BMP in about 10 days with hope to increase entresto again    Change metoprolol tartrate to metoprolol succinate 25 mg once a day    DIET:   Do not use canned foods  Do not use any processed meats--any in a vacuum pack or canned  Fresh vegetables over frozen and if frozen rinse off  You may cook with salt lightly, do not add salt at the table

## 2020-08-21 NOTE — PROGRESS NOTES
"Subjective:     Patient ID:  Juan Alberto Benavides is a 69 y.o. male who presents for follow-up of Heart Failure    HPI:  68 yo WM underwent successful LM stent and subsequent wean off milrinone.  He was sent home 8/7/20 on Entresto 24-26 mg BID and on Mon 8/17 obtain BMP with plan to increase to 49-51 mg BID.  He was also sent home on reduced torsemide dose 50 mg 2 days/week.  On Mon 8/17 Cr. 1.3 and BUN 16 but K 5.3 so I stopped KCl 10 meq with torsemide and repeated labs thinking K lab error.  He had repeat BMP on 8/19 with BUN 43, Cr 1.9 and K 4.4.  He had taken torsemide dose day between these labs.  He reports after taking torsemide left flank hurts and feels "bad." Weight fluctuates 3# around use of diuretics.  He denies CP, orthopnea, PND or symptomatic arrhythmia.    Review of Systems   Constitution: Negative for chills, decreased appetite, fever, malaise/fatigue, weight gain and weight loss.   Cardiovascular: Negative for chest pain, dyspnea on exertion, irregular heartbeat, leg swelling, near-syncope, orthopnea, palpitations, paroxysmal nocturnal dyspnea and syncope.   Respiratory: Negative for cough, shortness of breath, sleep disturbances due to breathing, sputum production and wheezing.    Hematologic/Lymphatic: Does not bruise/bleed easily.   Gastrointestinal: Negative for anorexia (great appetite, "getting too good").   Genitourinary:        Left flank discomfort on days after taking torsemide   Neurological: Negative for brief paralysis and focal weakness.     Objective:   Physical Exam   Constitutional: He appears well-developed and well-nourished. No distress.   /65 (BP Location: Left arm, Patient Position: Sitting, BP Method: Large (Automatic))   Pulse (!) 53   Ht 5' 7" (1.702 m)   Wt 77.8 kg (171 lb 8.3 oz)   BMI 26.86 kg/m²   Wt 170# last visit day of admit to wean milrinone and dry at that time   HENT:   Head: Normocephalic and atraumatic.   Eyes: Conjunctivae are normal. No scleral " icterus.   Neck: No JVD present. No thyromegaly present.   Cardiovascular: Normal rate, regular rhythm and normal heart sounds. Exam reveals no gallop.   No murmur heard.  Pulmonary/Chest: Effort normal and breath sounds normal.   Abdominal: Soft. Bowel sounds are normal. He exhibits no distension and no mass. There is no abdominal tenderness. There is no rebound and no guarding.   Musculoskeletal:         General: No tenderness or edema.   Skin: Skin is warm and dry. He is not diaphoretic.      Outside lab:  8/17/20 K5.3 BUN/CR 16/1.3   8/19/20 K 4.4 Cr 1.86 BUN 43    Lab Results   Component Value Date    BNP 26 08/06/2020     (H) 06/05/2020       08/07/2020    K 4.2 08/07/2020    MG 2.2 08/06/2020     08/07/2020    CO2 29 08/07/2020    BUN 22 08/07/2020    CREATININE 1.1 08/07/2020    GLU 89 08/07/2020    AST 47 (H) 08/06/2020    ALT 85 (H) 08/06/2020    ALBUMIN 3.7 08/06/2020    PROT 7.5 08/06/2020    BILITOT 0.3 08/06/2020    WBC 9.33 08/06/2020    HGB 12.3 (L) 08/06/2020    HCT 39.4 (L) 08/06/2020     08/06/2020     Assessment:     1. Chronic systolic congestive heart failure    2. JAMES (acute kidney injury)    3. Coronary artery disease involving native coronary artery of native heart without angina pectoris    4. Paroxysmal atrial fibrillation    5. Cardiomyopathy, unspecified type    6. History of B-cell lymphoma of thyroid    7. Chronic anticoagulation      Plan:   Stop torsemide and potassium--clear relationship between torsemide and renal function.  He should not need with improvement in LVEF and addition of Entresto.     His dry weight should settle in about 1 week off fluid pill and try to stay within 5 pounds of that weight    Reduce Entresto to 24-26 mg twice a day until renal function recovers off torsemide with plan to up-titrate once again.    Repeat BMP in about 10 days with hope to increase entresto again and if so repeat bmp 2 weeks later with hope to increase to   mg BID dose and repeat BMP 2 weeks later RTC after on max Entresto 4-6 weeks    Change metoprolol tartrate to metoprolol succinate 25 mg once a day    DIET:   Do not use canned foods  Do not use any processed meats--any in a vacuum pack or canned  Fresh vegetables over frozen and if frozen rinse off  You may cook with salt lightly, do not add salt at the table

## 2020-11-05 DIAGNOSIS — I50.22 CHRONIC SYSTOLIC CONGESTIVE HEART FAILURE: Primary | ICD-10-CM
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
## 2020-11-06 RX ORDER — SACUBITRIL AND VALSARTAN 97; 103 MG/1; MG/1
1 TABLET, FILM COATED ORAL 2 TIMES DAILY
Qty: 180 TABLET | Refills: 3 | Status: SHIPPED | OUTPATIENT
Start: 2020-11-06 | End: 2020-12-28 | Stop reason: SINTOL

## 2020-11-13 ENCOUNTER — TELEPHONE (OUTPATIENT)
Dept: TRANSPLANT | Facility: CLINIC | Age: 70
End: 2020-11-13

## 2020-11-14 NOTE — TELEPHONE ENCOUNTER
"1040 am:  F/U on yesterdays nurse lab phone review   Received lab results this am from local hospital   BMP:  Na/K:  139/5.3     Bun/Cr:  18.9/1.7    See my 10/28 NN regarding Entresto dose increase which is reason for this nurse lab phone review    Noted Cr up a little as is K    Cr usually:  1.1-1.5      K+: 4.9-5.3  Called and spoke with pt  Informed pt of kidney function being a little elevated as is his potassium level   Pt confirmed he has been taking Entresto 24/26 (4) tablets twice daily   Has about 1 1/2 weeks left and will call [facility] pharmacy to see if 97/103 dose has been approved and if they will be mailing it to him soon  I see it was sent and received 11/6 pm    I provided pt with that pharmacy phone number and asked pt to call me if any trouble with this prescription   Pt confirmed he is not taking Torsemide as directed and wts have been fine: 170-175  No signs of fluid      Re: elevated potassium  Pt does not seem to be eating any foods high in potassium   Though pt said he and his wife read the "salt substitute " ingredients and indicated there is no sodium but seems to be high in potassium  Asked pt to refrain from using this for now     Pt due to RTC 12/2/20 w/ labs and is aware of this appt.    Told pt I will discuss with Dr. Barr and get back with him.      11/13/20    4:20 pm:  Discussed all with Dr. Barr and provided him with yestedays lab results as well as previous ones  VORB: Dr. Schroeder/Aleksey RN:   Eliminated fluid restriction for now   Do not add salt, but ok for wife to cook with a little salt  No canned foods  No prepackaged meats  BMP In 10 days   And  See me as scheduled 12/2 with labs.     6:15 pm:  Called and spoke pt and instructed him as orders above   And of lab work needed again in 10 days locally ( Tuesday 11/24/2020)      6:20 pm  Message sent to LARY French asking she call pt and schedule this lab  Pt prefers to have lab order slip in hand  "

## 2020-12-02 ENCOUNTER — LAB VISIT (OUTPATIENT)
Dept: LAB | Facility: HOSPITAL | Age: 70
End: 2020-12-02
Attending: INTERNAL MEDICINE
Payer: MEDICARE

## 2020-12-02 ENCOUNTER — OFFICE VISIT (OUTPATIENT)
Dept: TRANSPLANT | Facility: CLINIC | Age: 70
End: 2020-12-02
Attending: INTERNAL MEDICINE
Payer: MEDICARE

## 2020-12-02 VITALS
HEART RATE: 58 BPM | SYSTOLIC BLOOD PRESSURE: 157 MMHG | HEIGHT: 67 IN | BODY MASS INDEX: 27.78 KG/M2 | WEIGHT: 177 LBS | DIASTOLIC BLOOD PRESSURE: 78 MMHG

## 2020-12-02 DIAGNOSIS — I25.10 CORONARY ARTERY DISEASE INVOLVING NATIVE CORONARY ARTERY OF NATIVE HEART WITHOUT ANGINA PECTORIS: ICD-10-CM

## 2020-12-02 DIAGNOSIS — I50.42 CHRONIC COMBINED SYSTOLIC AND DIASTOLIC CONGESTIVE HEART FAILURE: Primary | ICD-10-CM

## 2020-12-02 DIAGNOSIS — I42.9 CARDIOMYOPATHY, UNSPECIFIED TYPE: ICD-10-CM

## 2020-12-02 DIAGNOSIS — I48.0 PAROXYSMAL ATRIAL FIBRILLATION: ICD-10-CM

## 2020-12-02 DIAGNOSIS — I50.22 CHRONIC SYSTOLIC CONGESTIVE HEART FAILURE: ICD-10-CM

## 2020-12-02 DIAGNOSIS — N17.9 AKI (ACUTE KIDNEY INJURY): ICD-10-CM

## 2020-12-02 LAB
ALBUMIN SERPL BCP-MCNC: 3.8 G/DL (ref 3.5–5.2)
ALP SERPL-CCNC: 91 U/L (ref 55–135)
ALT SERPL W/O P-5'-P-CCNC: 56 U/L (ref 10–44)
ANION GAP SERPL CALC-SCNC: 8 MMOL/L (ref 8–16)
AST SERPL-CCNC: 37 U/L (ref 10–40)
BILIRUB SERPL-MCNC: 0.5 MG/DL (ref 0.1–1)
BUN SERPL-MCNC: 42 MG/DL (ref 8–23)
CALCIUM SERPL-MCNC: 9.2 MG/DL (ref 8.7–10.5)
CHLORIDE SERPL-SCNC: 101 MMOL/L (ref 95–110)
CO2 SERPL-SCNC: 28 MMOL/L (ref 23–29)
CREAT SERPL-MCNC: 2.1 MG/DL (ref 0.5–1.4)
EST. GFR  (AFRICAN AMERICAN): 36.3 ML/MIN/1.73 M^2
EST. GFR  (NON AFRICAN AMERICAN): 31.4 ML/MIN/1.73 M^2
GLUCOSE SERPL-MCNC: 96 MG/DL (ref 70–110)
MAGNESIUM SERPL-MCNC: 2.1 MG/DL (ref 1.6–2.6)
POTASSIUM SERPL-SCNC: 4.7 MMOL/L (ref 3.5–5.1)
PROT SERPL-MCNC: 7.3 G/DL (ref 6–8.4)
SODIUM SERPL-SCNC: 137 MMOL/L (ref 136–145)

## 2020-12-02 PROCEDURE — 83735 ASSAY OF MAGNESIUM: CPT

## 2020-12-02 PROCEDURE — 80053 COMPREHEN METABOLIC PANEL: CPT

## 2020-12-02 PROCEDURE — 99214 PR OFFICE/OUTPT VISIT, EST, LEVL IV, 30-39 MIN: ICD-10-PCS | Mod: S$PBB,,, | Performed by: INTERNAL MEDICINE

## 2020-12-02 PROCEDURE — 99999 PR PBB SHADOW E&M-EST. PATIENT-LVL III: CPT | Mod: PBBFAC,,, | Performed by: INTERNAL MEDICINE

## 2020-12-02 PROCEDURE — 36415 COLL VENOUS BLD VENIPUNCTURE: CPT

## 2020-12-02 PROCEDURE — 99214 OFFICE O/P EST MOD 30 MIN: CPT | Mod: S$PBB,,, | Performed by: INTERNAL MEDICINE

## 2020-12-02 PROCEDURE — 99213 OFFICE O/P EST LOW 20 MIN: CPT | Mod: PBBFAC | Performed by: INTERNAL MEDICINE

## 2020-12-02 PROCEDURE — 99999 PR PBB SHADOW E&M-EST. PATIENT-LVL III: ICD-10-PCS | Mod: PBBFAC,,, | Performed by: INTERNAL MEDICINE

## 2020-12-02 RX ORDER — TORSEMIDE 100 MG/1
0.5 TABLET ORAL
COMMUNITY
Start: 2020-08-29 | End: 2020-12-02 | Stop reason: SDUPTHER

## 2020-12-02 RX ORDER — TORSEMIDE 20 MG/1
20 TABLET ORAL
Qty: 30 TABLET | Refills: 3 | Status: SHIPPED | OUTPATIENT
Start: 2020-12-02

## 2020-12-02 NOTE — Clinical Note
He should have labs repeated in [place] again on 12/21--he knows if he would just fax an order for a BMP and a BNP  Thanks    To schedulers make appointment to see me around March 29th with BMP and BNP same day lab here

## 2020-12-02 NOTE — LETTER
December 17, 2020        Jaylin Campos  [address] [place] [address]  [facility] [place] [address]  Phone: [phone]  Fax: [phone]             [id number]  [address]  [place] [address]  Phone: [phone]   Patient: Juan Alberto Benavides   MR Number: [id number]   YOB: 1950   Date of Visit: 12/2/2020       Dear Dr. Jaylin Campos    Thank you for referring Juan Alberto Benavides to me for evaluation. Attached you will find relevant portions of my assessment and plan of care.    If you have questions, please do not hesitate to call me. I look forward to following Juan Alberto Benavides along with you.    Sincerely,    Antonio Barr Jr, MD    Enclosure    If you would like to receive this communication electronically, please contact [email or web address] or [phone] to request treadalong Link access.    treadalong Link is a tool which provides read-only access to select patient information with whom you have a relationship. Its easy to use and provides real time access to review your patients record including encounter summaries, notes, results, and demographic information.    If you feel you have received this communication in error or would no longer like to receive these types of communications, please e-mail [email or web address]

## 2020-12-08 ENCOUNTER — TELEPHONE (OUTPATIENT)
Dept: TRANSPLANT | Facility: CLINIC | Age: 70
End: 2020-12-08

## 2020-12-17 NOTE — PROGRESS NOTES
Subjective:     Patient ID:  Juan Alberto Benavides is a 70 y.o. male who presents for follow-up of Congestive Heart Failure    HPI:  71 yo WM underwent successful LM stent and subsequent wean off milrinone.  He was sent home 8/7/20 on Entresto 24-26 mg BID and on Mon 8/17 obtain BMP with plan to increase to 49-51 mg BID.  He was also sent home on reduced torsemide dose 50 mg 2 days/week.  On 8/17/20 Cr. 1.3 and BUN 16 but K 5.3 so I stopped KCl 10 meq with torsemide and repeated labs thinking K lab error.  He had repeat BMP on 8/19/20 with BUN 43, Cr 1.9 and K 4.4.  He had taken torsemide dose day between these labs.      At visit 8/21/20 I asked him to stop torsemide and potassium--clear relationship between torsemide and renal function--and explained that he should not need with improvement in LVEF and the addition of Entresto.  In the meantime I reduced Entresto to 24-26 mg twice a day until renal function recovers off torsemide with plan to up-titrate once again.      Today he reports home weight 170-175 lb.  He feels well the says he occasionally has to use torsemide for bloating sensation in his abdomen.  He was using torsemide 50 mg (half of 100 mg tablet) on average 2 days per week.  Interestingly, he took a dose 11/30/20 and Cr today 2.1 up from 1.5.       He denies CP, orthopnea, PND or symptomatic arrhythmia.  I am not sure the bloating he describes has anything to do with fluid.  He continues to take torsemide as feels this gives him relief so as a compromise will try torsemide 20 mg prn bloating and limit to 2 doses/week.  Will f/u labs on this regimen at home.    Review of Systems   Constitution: Negative for chills, decreased appetite, fever, malaise/fatigue, weight gain and weight loss.   Cardiovascular: Negative for chest pain, dyspnea on exertion, irregular heartbeat, leg swelling, near-syncope, orthopnea, palpitations, paroxysmal nocturnal dyspnea and syncope.   Respiratory: Negative for cough,  "shortness of breath, sleep disturbances due to breathing, sputum production and wheezing.    Hematologic/Lymphatic: Does not bruise/bleed easily.   Gastrointestinal: Negative for anorexia (great appetite, "getting too good").   Genitourinary:        Left flank discomfort on days after taking torsemide   Neurological: Negative for brief paralysis and focal weakness.     Objective:   Physical Exam   Constitutional: He appears well-developed and well-nourished. No distress.   /65 (BP Location: Left arm, Patient Position: Sitting, BP Method: Large (Automatic))   Pulse (!) 53   Ht 5' 7" (1.702 m)   Wt 77.8 kg (171 lb 8.3 oz)   BMI 26.86 kg/m²   Wt 170# last visit day of admit to wean milrinone and dry at that time   HENT:   Head: Normocephalic and atraumatic.   Eyes: Conjunctivae are normal. No scleral icterus.   Neck: No JVD present. No thyromegaly present.   Cardiovascular: Normal rate, regular rhythm and normal heart sounds. Exam reveals no gallop.   No murmur heard.  Pulmonary/Chest: Effort normal and breath sounds normal.   Abdominal: Soft. Bowel sounds are normal. He exhibits no distension and no mass. There is no abdominal tenderness. There is no rebound and no guarding.   Musculoskeletal:         General: No tenderness or edema.   Skin: Skin is warm and dry. He is not diaphoretic.      Lab Results   Component Value Date     12/02/2020    K 4.7 12/02/2020    MG 2.1 12/02/2020     12/02/2020    CO2 28 12/02/2020    BUN 42 (H) 12/02/2020    CREATININE 2.1 (H) 12/02/2020    GLU 96 12/02/2020    AST 37 12/02/2020    ALT 56 (H) 12/02/2020    ALBUMIN 3.8 12/02/2020    PROT 7.3 12/02/2020    BILITOT 0.5 12/02/2020     Assessment:     1. Chronic combined systolic and diastolic congestive heart failure    2. JAMES (acute kidney injury)    3. Cardiomyopathy, unspecified type    4. Coronary artery disease involving native coronary artery of native heart without angina pectoris    5. Paroxysmal atrial " fibrillation      Plan:   I am not sure the bloating he describes has anything to do with fluid.  He continues to take torsemide as feels this gives him relief so as a compromise will try torsemide 20 mg prn bloating and limit to 2 doses/week.    Will f/u labs on this regimen at home with BMP and BNP Monday 12/7/20  RTC 3-4 months and as long as all going well at that time should be able to go 6 months    12/17/20 10:45 AM ADDENDUM:  Labs from [place] 12/7/20  Na/K:  137/5.1     Bun/Cr:  15.6/1.3    CLlCo2:  104/24  He has not used torsemide since clinic visit 12/2 and doing well with home wt 170#.  His attending at home has him monitoring his blood pressure twice daily and he reports today morning pressure is generally 135-140/70s and in the evening blood pressure 110-120/55-65.  No symptoms, feels great, no abdominal bloating off the torsemide since visit 12/02/2020    I have asked him to repeat the BMP with BNP on 12/21 at [facility]

## 2020-12-22 ENCOUNTER — TELEPHONE (OUTPATIENT)
Dept: TRANSPLANT | Facility: CLINIC | Age: 70
End: 2020-12-22

## 2020-12-22 DIAGNOSIS — I50.22 CHRONIC SYSTOLIC CONGESTIVE HEART FAILURE: Primary | ICD-10-CM

## 2020-12-22 NOTE — TELEPHONE ENCOUNTER
"Reviewed pts labs K is 5.6 bun/cr 24/ 1.5. Pt report eating "lots of potatoes" and she cooking lots of potato meals." I asked pt to stop the excessive eating of potatoes. Pt verbalized understanding. Bmp tomorrow per Dr Barr.couldnt reach pt to inform him of labs called twice. Maggy call pt in am.to arrange labs.  .   "

## 2020-12-28 DIAGNOSIS — I50.22 CHRONIC SYSTOLIC CONGESTIVE HEART FAILURE: ICD-10-CM

## 2020-12-29 ENCOUNTER — TELEPHONE (OUTPATIENT)
Dept: TRANSPLANT | Facility: CLINIC | Age: 70
End: 2020-12-29

## 2020-12-29 RX ORDER — SACUBITRIL AND VALSARTAN 49; 51 MG/1; MG/1
1 TABLET, FILM COATED ORAL 2 TIMES DAILY
Qty: 60 TABLET | Refills: 11
Start: 2020-12-29

## 2020-12-29 NOTE — TELEPHONE ENCOUNTER
1;30 pm:  F/U on yesterdays nurse lab phone review  Received BMP results today from collection date 12/28/20   K+ improved and is 4.9   Cr:  1.4    ( reviewed with Dr. Barr, no new orders)     3:30 pm:  Called pt   NA at either phone number listed and no voice mail boxes set up

## 2021-03-05 ENCOUNTER — OFFICE VISIT (OUTPATIENT)
Dept: TRANSPLANT | Facility: CLINIC | Age: 71
End: 2021-03-05
Attending: INTERNAL MEDICINE
Payer: MEDICARE

## 2021-03-05 ENCOUNTER — LAB VISIT (OUTPATIENT)
Dept: LAB | Facility: HOSPITAL | Age: 71
End: 2021-03-05
Attending: INTERNAL MEDICINE
Payer: MEDICARE

## 2021-03-05 VITALS
WEIGHT: 174.81 LBS | HEART RATE: 73 BPM | HEIGHT: 67 IN | BODY MASS INDEX: 27.44 KG/M2 | DIASTOLIC BLOOD PRESSURE: 75 MMHG | SYSTOLIC BLOOD PRESSURE: 124 MMHG

## 2021-03-05 DIAGNOSIS — N18.32 STAGE 3B CHRONIC KIDNEY DISEASE: ICD-10-CM

## 2021-03-05 DIAGNOSIS — I25.10 CORONARY ARTERY DISEASE INVOLVING NATIVE CORONARY ARTERY OF NATIVE HEART WITHOUT ANGINA PECTORIS: ICD-10-CM

## 2021-03-05 DIAGNOSIS — Z85.72 HISTORY OF B-CELL LYMPHOMA: ICD-10-CM

## 2021-03-05 DIAGNOSIS — Z79.01 CHRONIC ANTICOAGULATION: ICD-10-CM

## 2021-03-05 DIAGNOSIS — I48.0 PAROXYSMAL ATRIAL FIBRILLATION: ICD-10-CM

## 2021-03-05 DIAGNOSIS — I50.42 CHRONIC COMBINED SYSTOLIC AND DIASTOLIC CONGESTIVE HEART FAILURE: Primary | ICD-10-CM

## 2021-03-05 DIAGNOSIS — I42.9 CARDIOMYOPATHY, UNSPECIFIED TYPE: ICD-10-CM

## 2021-03-05 DIAGNOSIS — I50.42 CHRONIC COMBINED SYSTOLIC AND DIASTOLIC CONGESTIVE HEART FAILURE: ICD-10-CM

## 2021-03-05 LAB
ANION GAP SERPL CALC-SCNC: 11 MMOL/L (ref 8–16)
BNP SERPL-MCNC: 51 PG/ML (ref 0–99)
BUN SERPL-MCNC: 45 MG/DL (ref 8–23)
CALCIUM SERPL-MCNC: 9.8 MG/DL (ref 8.7–10.5)
CHLORIDE SERPL-SCNC: 103 MMOL/L (ref 95–110)
CO2 SERPL-SCNC: 27 MMOL/L (ref 23–29)
CREAT SERPL-MCNC: 1.6 MG/DL (ref 0.5–1.4)
EST. GFR  (AFRICAN AMERICAN): 50.4 ML/MIN/1.73 M^2
EST. GFR  (NON AFRICAN AMERICAN): 43.6 ML/MIN/1.73 M^2
GLUCOSE SERPL-MCNC: 92 MG/DL (ref 70–110)
POTASSIUM SERPL-SCNC: 5 MMOL/L (ref 3.5–5.1)
SODIUM SERPL-SCNC: 141 MMOL/L (ref 136–145)

## 2021-03-05 PROCEDURE — 83880 ASSAY OF NATRIURETIC PEPTIDE: CPT | Performed by: INTERNAL MEDICINE

## 2021-03-05 PROCEDURE — 36415 COLL VENOUS BLD VENIPUNCTURE: CPT | Performed by: INTERNAL MEDICINE

## 2021-03-05 PROCEDURE — 99214 OFFICE O/P EST MOD 30 MIN: CPT | Mod: S$PBB,,, | Performed by: INTERNAL MEDICINE

## 2021-03-05 PROCEDURE — 80048 BASIC METABOLIC PNL TOTAL CA: CPT | Performed by: INTERNAL MEDICINE

## 2021-03-05 PROCEDURE — 99999 PR PBB SHADOW E&M-EST. PATIENT-LVL IV: ICD-10-PCS | Mod: PBBFAC,,, | Performed by: INTERNAL MEDICINE

## 2021-03-05 PROCEDURE — 99214 PR OFFICE/OUTPT VISIT, EST, LEVL IV, 30-39 MIN: ICD-10-PCS | Mod: S$PBB,,, | Performed by: INTERNAL MEDICINE

## 2021-03-05 PROCEDURE — 99214 OFFICE O/P EST MOD 30 MIN: CPT | Mod: PBBFAC | Performed by: INTERNAL MEDICINE

## 2021-03-05 PROCEDURE — 99999 PR PBB SHADOW E&M-EST. PATIENT-LVL IV: CPT | Mod: PBBFAC,,, | Performed by: INTERNAL MEDICINE

## 2021-03-05 RX ORDER — ONDANSETRON HYDROCHLORIDE 8 MG/1
TABLET, FILM COATED ORAL EVERY 8 HOURS PRN
COMMUNITY

## 2021-03-06 PROBLEM — N17.9 AKI (ACUTE KIDNEY INJURY): Status: RESOLVED | Noted: 2020-08-07 | Resolved: 2021-03-06

## 2021-03-08 ENCOUNTER — TELEPHONE (OUTPATIENT)
Dept: TRANSPLANT | Facility: CLINIC | Age: 71
End: 2021-03-08

## 2021-03-09 DIAGNOSIS — I25.10 CORONARY ARTERY DISEASE INVOLVING NATIVE CORONARY ARTERY OF NATIVE HEART WITHOUT ANGINA PECTORIS: ICD-10-CM

## 2021-03-09 DIAGNOSIS — I48.0 PAROXYSMAL ATRIAL FIBRILLATION: ICD-10-CM

## 2021-03-09 DIAGNOSIS — I50.22 CHRONIC SYSTOLIC CONGESTIVE HEART FAILURE: ICD-10-CM

## 2021-03-10 RX ORDER — DIGOXIN 125 MCG
125 TABLET ORAL
Qty: 45 TABLET | Refills: 3 | Status: SHIPPED | OUTPATIENT
Start: 2021-03-10

## 2021-03-10 RX ORDER — CLOPIDOGREL BISULFATE 75 MG/1
TABLET ORAL
Qty: 90 TABLET | Refills: 3 | Status: SHIPPED | OUTPATIENT
Start: 2021-03-10

## 2021-03-10 RX ORDER — METOPROLOL SUCCINATE 25 MG/1
25 TABLET, EXTENDED RELEASE ORAL DAILY
Qty: 90 TABLET | Refills: 3 | Status: SHIPPED | OUTPATIENT
Start: 2021-03-10

## 2021-04-05 ENCOUNTER — TELEPHONE (OUTPATIENT)
Dept: TRANSPLANT | Facility: CLINIC | Age: 71
End: 2021-04-05

## 2021-04-07 ENCOUNTER — TELEPHONE (OUTPATIENT)
Dept: TRANSPLANT | Facility: CLINIC | Age: 71
End: 2021-04-07

## 2021-05-05 ENCOUNTER — TELEPHONE (OUTPATIENT)
Dept: TRANSPLANT | Facility: CLINIC | Age: 71
End: 2021-05-05

## 2021-05-07 ENCOUNTER — TELEPHONE (OUTPATIENT)
Dept: TRANSPLANT | Facility: CLINIC | Age: 71
End: 2021-05-07

## 2021-05-07 DIAGNOSIS — N18.32 STAGE 3B CHRONIC KIDNEY DISEASE: ICD-10-CM

## 2021-05-07 DIAGNOSIS — E87.5 HYPERKALEMIA: Primary | ICD-10-CM

## 2021-05-19 RX ORDER — AMIODARONE HYDROCHLORIDE 200 MG/1
200 TABLET ORAL DAILY
Qty: 30 TABLET | Refills: 6 | Status: SHIPPED | OUTPATIENT
Start: 2021-05-19 | End: 2021-05-21 | Stop reason: SDUPTHER

## 2021-05-21 DIAGNOSIS — I48.0 PAROXYSMAL ATRIAL FIBRILLATION: Primary | ICD-10-CM

## 2021-05-21 RX ORDER — AMIODARONE HYDROCHLORIDE 200 MG/1
200 TABLET ORAL DAILY
Qty: 90 TABLET | Refills: 3 | Status: SHIPPED | OUTPATIENT
Start: 2021-05-21

## 2021-06-07 ENCOUNTER — TELEPHONE (OUTPATIENT)
Dept: TRANSPLANT | Facility: CLINIC | Age: 71
End: 2021-06-07

## 2021-07-29 ENCOUNTER — TELEPHONE (OUTPATIENT)
Dept: TRANSPLANT | Facility: CLINIC | Age: 71
End: 2021-07-29

## (undated) DEVICE — FLUID DELIVERY SET WITH FILTER

## (undated) DEVICE — GUIDE LAUNCHER 6FR EBU 3.5

## (undated) DEVICE — SHEATH INTRODUCER 7FR 11CM

## (undated) DEVICE — KIT MICROINTRO 4F .018X40X7CM

## (undated) DEVICE — STOPCOCK 3-WAY

## (undated) DEVICE — GUIDEWIRE STF .035X180CM ANG

## (undated) DEVICE — GUIDEWIRE ROTAWIRE 330X0.014CM

## (undated) DEVICE — CATH EAGLE EYE PLATINUM

## (undated) DEVICE — INFLATOR ENCORE 26 BLLN INFL

## (undated) DEVICE — WIRE DOC EXTENSION

## (undated) DEVICE — LINE 60IN PRESSURE MON.

## (undated) DEVICE — KIT PROBE COVER WITH GEL

## (undated) DEVICE — KIT CO-PILOT

## (undated) DEVICE — CATH DXTERITY JL40 100CM 6FR

## (undated) DEVICE — CATH IMA INFINITI 4FRX100CM

## (undated) DEVICE — SHEATH INTRODUCER 6FR 11CM

## (undated) DEVICE — GUIDE WIRE BMW 014 X190

## (undated) DEVICE — CATH SWAN GANZ STND 7FR

## (undated) DEVICE — DEVICE MYNX GRIP 6/7F

## (undated) DEVICE — CATH IMPELLA CP 14F 4.7X65MM

## (undated) DEVICE — BURR ADVANCER ROTAPRO 1.5X135

## (undated) DEVICE — CATH BLLN FG APEX MR 4.00X8MM

## (undated) DEVICE — CATH ALII 4FR INFINITY

## (undated) DEVICE — PROTECTION STATION PLUS

## (undated) DEVICE — SEE MEDLINE ITEM 156894

## (undated) DEVICE — KIT CUSTOM MANIFOLD

## (undated) DEVICE — DEVICE PERCLOSE SUT CLSR 6FR

## (undated) DEVICE — CATH MICRO CORSAIR PRO 135CM

## (undated) DEVICE — SPIKE CONTRAST CONTROLLER

## (undated) DEVICE — SHEATH INTRODUCER 8FR 11CM

## (undated) DEVICE — CATH DXTERITY JR40 100CM 6FR

## (undated) DEVICE — GUIDEWIRE EMERALD 150CM PTFE

## (undated) DEVICE — SEE MEDLINE ITEM 157187

## (undated) DEVICE — OMNIPAQUE 350 200ML